# Patient Record
Sex: MALE | Race: BLACK OR AFRICAN AMERICAN | NOT HISPANIC OR LATINO | RURAL
[De-identification: names, ages, dates, MRNs, and addresses within clinical notes are randomized per-mention and may not be internally consistent; named-entity substitution may affect disease eponyms.]

---

## 2021-05-11 VITALS
HEART RATE: 96 BPM | DIASTOLIC BLOOD PRESSURE: 74 MMHG | SYSTOLIC BLOOD PRESSURE: 170 MMHG | HEIGHT: 74 IN | TEMPERATURE: 98 F | RESPIRATION RATE: 20 BRPM | BODY MASS INDEX: 35.04 KG/M2 | WEIGHT: 273 LBS

## 2021-05-11 RX ORDER — GABAPENTIN 600 MG/1
600 TABLET ORAL 3 TIMES DAILY
COMMUNITY
End: 2022-02-15 | Stop reason: ALTCHOICE

## 2021-05-11 RX ORDER — MODAFINIL 200 MG/1
200 TABLET ORAL DAILY PRN
COMMUNITY
End: 2021-11-15 | Stop reason: ALTCHOICE

## 2021-05-11 RX ORDER — ALPRAZOLAM 0.5 MG/1
0.5 TABLET ORAL 3 TIMES DAILY
COMMUNITY
End: 2021-05-17 | Stop reason: SDUPTHER

## 2021-05-11 RX ORDER — LOSARTAN POTASSIUM 100 MG/1
100 TABLET ORAL DAILY
COMMUNITY

## 2021-05-11 RX ORDER — DEXTROAMPHETAMINE SACCHARATE, AMPHETAMINE ASPARTATE MONOHYDRATE, DEXTROAMPHETAMINE SULFATE AND AMPHETAMINE SULFATE 7.5; 7.5; 7.5; 7.5 MG/1; MG/1; MG/1; MG/1
30 CAPSULE, EXTENDED RELEASE ORAL EVERY MORNING
COMMUNITY
End: 2021-07-08

## 2021-05-11 RX ORDER — ATORVASTATIN CALCIUM 10 MG/1
10 TABLET, FILM COATED ORAL DAILY
COMMUNITY

## 2021-05-11 RX ORDER — METFORMIN HYDROCHLORIDE 500 MG/1
500 TABLET ORAL DAILY
COMMUNITY
End: 2021-07-08

## 2021-05-11 RX ORDER — ACETAMINOPHEN 325 MG/1
325 TABLET ORAL EVERY 6 HOURS PRN
COMMUNITY
End: 2023-02-27

## 2021-05-11 RX ORDER — HYDROCHLOROTHIAZIDE 12.5 MG/1
12.5 TABLET ORAL DAILY
COMMUNITY

## 2021-05-17 ENCOUNTER — OFFICE VISIT (OUTPATIENT)
Dept: FAMILY MEDICINE | Facility: CLINIC | Age: 51
End: 2021-05-17
Payer: COMMERCIAL

## 2021-05-17 VITALS
DIASTOLIC BLOOD PRESSURE: 72 MMHG | BODY MASS INDEX: 34.93 KG/M2 | HEIGHT: 74 IN | SYSTOLIC BLOOD PRESSURE: 140 MMHG | RESPIRATION RATE: 16 BRPM | WEIGHT: 272.19 LBS | HEART RATE: 84 BPM

## 2021-05-17 DIAGNOSIS — E11.9 TYPE 2 DIABETES MELLITUS WITHOUT COMPLICATION, WITHOUT LONG-TERM CURRENT USE OF INSULIN: ICD-10-CM

## 2021-05-17 DIAGNOSIS — F43.11 POST-TRAUMATIC STRESS DISORDER, ACUTE: ICD-10-CM

## 2021-05-17 DIAGNOSIS — F41.0 PANIC DISORDER (EPISODIC PAROXYSMAL ANXIETY): ICD-10-CM

## 2021-05-17 DIAGNOSIS — Z79.899 ENCOUNTER FOR LONG-TERM (CURRENT) USE OF OTHER MEDICATIONS: Primary | ICD-10-CM

## 2021-05-17 DIAGNOSIS — F98.8 ATTENTION DEFICIT DISORDER (ADD) WITHOUT HYPERACTIVITY: ICD-10-CM

## 2021-05-17 DIAGNOSIS — I10 ESSENTIAL HYPERTENSION: ICD-10-CM

## 2021-05-17 LAB
CTP QC/QA: YES
POC (AMP) AMPHETAMINE: ABNORMAL
POC (BAR) BARBITURATES: NEGATIVE
POC (BUP) BUPRENORPHINE: NEGATIVE
POC (BZO) BENZODIAZEPINES: NEGATIVE
POC (COC) COCAINE: NEGATIVE
POC (MDMA) METHYLENEDIOXYMETHAMPHETAMINE 3,4: NEGATIVE
POC (MET) METHAMPHETAMINE: NEGATIVE
POC (MOP) OPIATES: NEGATIVE
POC (MTD) METHADONE: NEGATIVE
POC (OXY) OXYCODONE: NEGATIVE
POC (PCP) PHENCYCLIDINE: NEGATIVE
POC (TCA) TRICYCLIC ANTIDEPRESSANTS: NEGATIVE
POC TEMPERATURE (URINE): 90
POC THC: NEGATIVE

## 2021-05-17 PROCEDURE — 80305 POCT URINE DRUG SCREEN PRESUMP: ICD-10-PCS | Mod: QW,,, | Performed by: FAMILY MEDICINE

## 2021-05-17 PROCEDURE — 80305 DRUG TEST PRSMV DIR OPT OBS: CPT | Mod: QW,,, | Performed by: FAMILY MEDICINE

## 2021-05-17 PROCEDURE — 99213 OFFICE O/P EST LOW 20 MIN: CPT | Mod: ,,, | Performed by: FAMILY MEDICINE

## 2021-05-17 PROCEDURE — 99213 PR OFFICE/OUTPT VISIT, EST, LEVL III, 20-29 MIN: ICD-10-PCS | Mod: ,,, | Performed by: FAMILY MEDICINE

## 2021-05-17 RX ORDER — DEXTROAMPHETAMINE SACCHARATE, AMPHETAMINE ASPARTATE, DEXTROAMPHETAMINE SULFATE AND AMPHETAMINE SULFATE 7.5; 7.5; 7.5; 7.5 MG/1; MG/1; MG/1; MG/1
1 TABLET ORAL DAILY
Qty: 30 TABLET | Refills: 0 | Status: SHIPPED | OUTPATIENT
Start: 2021-05-17 | End: 2021-08-13 | Stop reason: SDUPTHER

## 2021-05-17 RX ORDER — DEXTROAMPHETAMINE SACCHARATE, AMPHETAMINE ASPARTATE, DEXTROAMPHETAMINE SULFATE AND AMPHETAMINE SULFATE 7.5; 7.5; 7.5; 7.5 MG/1; MG/1; MG/1; MG/1
1 TABLET ORAL DAILY
COMMUNITY
Start: 2021-03-25 | End: 2021-05-17 | Stop reason: SDUPTHER

## 2021-05-17 RX ORDER — ALPRAZOLAM 0.5 MG/1
0.5 TABLET ORAL 3 TIMES DAILY
Qty: 45 TABLET | Refills: 2 | Status: SHIPPED | OUTPATIENT
Start: 2021-05-17 | End: 2021-08-13 | Stop reason: SDUPTHER

## 2021-05-17 RX ORDER — METFORMIN HYDROCHLORIDE 500 MG/1
500 TABLET, EXTENDED RELEASE ORAL DAILY
COMMUNITY
Start: 2021-02-09

## 2021-07-08 ENCOUNTER — OFFICE VISIT (OUTPATIENT)
Dept: FAMILY MEDICINE | Facility: CLINIC | Age: 51
End: 2021-07-08
Payer: COMMERCIAL

## 2021-07-08 VITALS
WEIGHT: 278 LBS | BODY MASS INDEX: 39.8 KG/M2 | HEART RATE: 95 BPM | OXYGEN SATURATION: 99 % | HEIGHT: 70 IN | SYSTOLIC BLOOD PRESSURE: 148 MMHG | TEMPERATURE: 98 F | DIASTOLIC BLOOD PRESSURE: 66 MMHG | RESPIRATION RATE: 20 BRPM

## 2021-07-08 DIAGNOSIS — G89.29 CHRONIC PAIN OF BOTH ANKLES: ICD-10-CM

## 2021-07-08 DIAGNOSIS — M25.572 CHRONIC PAIN OF BOTH ANKLES: ICD-10-CM

## 2021-07-08 DIAGNOSIS — M25.562 CHRONIC PAIN OF BOTH KNEES: Primary | ICD-10-CM

## 2021-07-08 DIAGNOSIS — G89.29 CHRONIC PAIN OF BOTH KNEES: Primary | ICD-10-CM

## 2021-07-08 DIAGNOSIS — M25.561 CHRONIC PAIN OF BOTH KNEES: Primary | ICD-10-CM

## 2021-07-08 DIAGNOSIS — M25.571 CHRONIC PAIN OF BOTH ANKLES: ICD-10-CM

## 2021-07-08 PROCEDURE — 1126F AMNT PAIN NOTED NONE PRSNT: CPT | Mod: ,,, | Performed by: FAMILY MEDICINE

## 2021-07-08 PROCEDURE — 99212 PR OFFICE/OUTPT VISIT, EST, LEVL II, 10-19 MIN: ICD-10-PCS | Mod: ,,, | Performed by: FAMILY MEDICINE

## 2021-07-08 PROCEDURE — 3008F BODY MASS INDEX DOCD: CPT | Mod: ,,, | Performed by: FAMILY MEDICINE

## 2021-07-08 PROCEDURE — 99212 OFFICE O/P EST SF 10 MIN: CPT | Mod: ,,, | Performed by: FAMILY MEDICINE

## 2021-07-08 PROCEDURE — 3008F PR BODY MASS INDEX (BMI) DOCUMENTED: ICD-10-PCS | Mod: ,,, | Performed by: FAMILY MEDICINE

## 2021-07-08 PROCEDURE — 1126F PR PAIN SEVERITY QUANTIFIED, NO PAIN PRESENT: ICD-10-PCS | Mod: ,,, | Performed by: FAMILY MEDICINE

## 2021-07-08 RX ORDER — MODAFINIL 200 MG/1
200 TABLET ORAL DAILY PRN
Qty: 90 TABLET | Refills: 1 | Status: CANCELLED | OUTPATIENT
Start: 2021-07-08

## 2021-07-08 RX ORDER — CELECOXIB 100 MG/1
100 CAPSULE ORAL 2 TIMES DAILY
COMMUNITY

## 2021-07-12 ENCOUNTER — CLINICAL SUPPORT (OUTPATIENT)
Dept: FAMILY MEDICINE | Facility: CLINIC | Age: 51
End: 2021-07-12
Payer: COMMERCIAL

## 2021-07-12 VITALS — DIASTOLIC BLOOD PRESSURE: 90 MMHG | SYSTOLIC BLOOD PRESSURE: 138 MMHG

## 2021-07-13 ENCOUNTER — CLINICAL SUPPORT (OUTPATIENT)
Dept: FAMILY MEDICINE | Facility: CLINIC | Age: 51
End: 2021-07-13
Payer: COMMERCIAL

## 2021-07-13 VITALS — SYSTOLIC BLOOD PRESSURE: 118 MMHG | DIASTOLIC BLOOD PRESSURE: 76 MMHG

## 2021-07-13 DIAGNOSIS — I10 ESSENTIAL HYPERTENSION: Primary | ICD-10-CM

## 2021-07-14 ENCOUNTER — CLINICAL SUPPORT (OUTPATIENT)
Dept: FAMILY MEDICINE | Facility: CLINIC | Age: 51
End: 2021-07-14
Payer: COMMERCIAL

## 2021-07-14 VITALS — SYSTOLIC BLOOD PRESSURE: 140 MMHG | DIASTOLIC BLOOD PRESSURE: 62 MMHG

## 2021-07-14 DIAGNOSIS — I10 ESSENTIAL HYPERTENSION: Primary | ICD-10-CM

## 2021-08-13 ENCOUNTER — OFFICE VISIT (OUTPATIENT)
Dept: FAMILY MEDICINE | Facility: CLINIC | Age: 51
End: 2021-08-13
Payer: COMMERCIAL

## 2021-08-13 VITALS
RESPIRATION RATE: 18 BRPM | WEIGHT: 284.38 LBS | HEIGHT: 70 IN | SYSTOLIC BLOOD PRESSURE: 142 MMHG | HEART RATE: 72 BPM | DIASTOLIC BLOOD PRESSURE: 70 MMHG | BODY MASS INDEX: 40.71 KG/M2

## 2021-08-13 VITALS
HEART RATE: 77 BPM | OXYGEN SATURATION: 96 % | WEIGHT: 284 LBS | HEIGHT: 70 IN | DIASTOLIC BLOOD PRESSURE: 74 MMHG | BODY MASS INDEX: 40.66 KG/M2 | RESPIRATION RATE: 20 BRPM | SYSTOLIC BLOOD PRESSURE: 134 MMHG

## 2021-08-13 DIAGNOSIS — M17.0 OSTEOARTHRITIS OF BOTH KNEES, UNSPECIFIED OSTEOARTHRITIS TYPE: Primary | ICD-10-CM

## 2021-08-13 DIAGNOSIS — F98.8 ATTENTION DEFICIT DISORDER (ADD) WITHOUT HYPERACTIVITY: ICD-10-CM

## 2021-08-13 DIAGNOSIS — M19.079 DJD (DEGENERATIVE JOINT DISEASE), ANKLE AND FOOT, UNSPECIFIED LATERALITY: ICD-10-CM

## 2021-08-13 DIAGNOSIS — I10 ESSENTIAL HYPERTENSION: Primary | ICD-10-CM

## 2021-08-13 DIAGNOSIS — Z79.899 ENCOUNTER FOR LONG-TERM (CURRENT) USE OF OTHER MEDICATIONS: ICD-10-CM

## 2021-08-13 DIAGNOSIS — F41.0 PANIC DISORDER (EPISODIC PAROXYSMAL ANXIETY): ICD-10-CM

## 2021-08-13 DIAGNOSIS — E11.9 TYPE 2 DIABETES MELLITUS WITHOUT COMPLICATION, WITHOUT LONG-TERM CURRENT USE OF INSULIN: ICD-10-CM

## 2021-08-13 LAB
ALBUMIN SERPL BCP-MCNC: 3.5 G/DL (ref 3.5–5)
ALBUMIN/GLOB SERPL: 1 {RATIO}
ALP SERPL-CCNC: 85 U/L (ref 45–115)
ALT SERPL W P-5'-P-CCNC: 34 U/L (ref 16–61)
ANION GAP SERPL CALCULATED.3IONS-SCNC: 11 MMOL/L (ref 7–16)
AST SERPL W P-5'-P-CCNC: 17 U/L (ref 15–37)
BASOPHILS # BLD AUTO: 0.03 K/UL (ref 0–0.2)
BASOPHILS NFR BLD AUTO: 0.5 % (ref 0–1)
BILIRUB SERPL-MCNC: 0.4 MG/DL (ref 0–1.2)
BUN SERPL-MCNC: 20 MG/DL (ref 7–18)
BUN/CREAT SERPL: 17 (ref 6–20)
CALCIUM SERPL-MCNC: 8.7 MG/DL (ref 8.5–10.1)
CHLORIDE SERPL-SCNC: 106 MMOL/L (ref 98–107)
CHOLEST SERPL-MCNC: 149 MG/DL (ref 0–200)
CHOLEST/HDLC SERPL: 2.4 {RATIO}
CO2 SERPL-SCNC: 28 MMOL/L (ref 21–32)
CREAT SERPL-MCNC: 1.21 MG/DL (ref 0.7–1.3)
CTP QC/QA: YES
DIFFERENTIAL METHOD BLD: ABNORMAL
EOSINOPHIL # BLD AUTO: 0.11 K/UL (ref 0–0.5)
EOSINOPHIL NFR BLD AUTO: 2 % (ref 1–4)
ERYTHROCYTE [DISTWIDTH] IN BLOOD BY AUTOMATED COUNT: 14.7 % (ref 11.5–14.5)
EST. AVERAGE GLUCOSE BLD GHB EST-MCNC: 124 MG/DL
GLOBULIN SER-MCNC: 3.4 G/DL (ref 2–4)
GLUCOSE SERPL-MCNC: 206 MG/DL (ref 74–106)
HBA1C MFR BLD HPLC: 6.3 % (ref 4.5–6.6)
HCT VFR BLD AUTO: 42.6 % (ref 40–54)
HDLC SERPL-MCNC: 62 MG/DL (ref 40–60)
HGB BLD-MCNC: 13.8 G/DL (ref 13.5–18)
IMM GRANULOCYTES # BLD AUTO: 0.02 K/UL (ref 0–0.04)
IMM GRANULOCYTES NFR BLD: 0.4 % (ref 0–0.4)
LDLC SERPL CALC-MCNC: 74 MG/DL
LDLC/HDLC SERPL: 1.2 {RATIO}
LYMPHOCYTES # BLD AUTO: 1.25 K/UL (ref 1–4.8)
LYMPHOCYTES NFR BLD AUTO: 22.4 % (ref 27–41)
MCH RBC QN AUTO: 27.7 PG (ref 27–31)
MCHC RBC AUTO-ENTMCNC: 32.4 G/DL (ref 32–36)
MCV RBC AUTO: 85.4 FL (ref 80–96)
MONOCYTES # BLD AUTO: 0.28 K/UL (ref 0–0.8)
MONOCYTES NFR BLD AUTO: 5 % (ref 2–6)
MPC BLD CALC-MCNC: 10.7 FL (ref 9.4–12.4)
NEUTROPHILS # BLD AUTO: 3.88 K/UL (ref 1.8–7.7)
NEUTROPHILS NFR BLD AUTO: 69.7 % (ref 53–65)
NONHDLC SERPL-MCNC: 87 MG/DL
NRBC # BLD AUTO: 0 X10E3/UL
NRBC, AUTO (.00): 0 %
PLATELET # BLD AUTO: 227 K/UL (ref 150–400)
POC (AMP) AMPHETAMINE: NEGATIVE
POC (BAR) BARBITURATES: NEGATIVE
POC (BUP) BUPRENORPHINE: NEGATIVE
POC (BZO) BENZODIAZEPINES: ABNORMAL
POC (COC) COCAINE: NEGATIVE
POC (MDMA) METHYLENEDIOXYMETHAMPHETAMINE 3,4: NEGATIVE
POC (MET) METHAMPHETAMINE: NEGATIVE
POC (MOP) OPIATES: NEGATIVE
POC (MTD) METHADONE: NEGATIVE
POC (OXY) OXYCODONE: NEGATIVE
POC (PCP) PHENCYCLIDINE: NEGATIVE
POC (TCA) TRICYCLIC ANTIDEPRESSANTS: NEGATIVE
POC TEMPERATURE (URINE): 90
POC THC: NEGATIVE
POTASSIUM SERPL-SCNC: 4 MMOL/L (ref 3.5–5.1)
PROT SERPL-MCNC: 6.9 G/DL (ref 6.4–8.2)
RBC # BLD AUTO: 4.99 M/UL (ref 4.6–6.2)
SODIUM SERPL-SCNC: 141 MMOL/L (ref 136–145)
TRIGL SERPL-MCNC: 63 MG/DL (ref 35–150)
VLDLC SERPL-MCNC: 13 MG/DL
WBC # BLD AUTO: 5.57 K/UL (ref 4.5–11)

## 2021-08-13 PROCEDURE — 3075F PR MOST RECENT SYSTOLIC BLOOD PRESS GE 130-139MM HG: ICD-10-PCS | Mod: ,,, | Performed by: FAMILY MEDICINE

## 2021-08-13 PROCEDURE — 80305 DRUG TEST PRSMV DIR OPT OBS: CPT | Mod: QW,,, | Performed by: FAMILY MEDICINE

## 2021-08-13 PROCEDURE — 85025 CBC WITH DIFFERENTIAL: ICD-10-PCS | Mod: ,,, | Performed by: CLINICAL MEDICAL LABORATORY

## 2021-08-13 PROCEDURE — 3078F PR MOST RECENT DIASTOLIC BLOOD PRESSURE < 80 MM HG: ICD-10-PCS | Mod: ,,, | Performed by: FAMILY MEDICINE

## 2021-08-13 PROCEDURE — 3044F PR MOST RECENT HEMOGLOBIN A1C LEVEL <7.0%: ICD-10-PCS | Mod: ,,, | Performed by: FAMILY MEDICINE

## 2021-08-13 PROCEDURE — 80053 COMPREHENSIVE METABOLIC PANEL: ICD-10-PCS | Mod: ,,, | Performed by: CLINICAL MEDICAL LABORATORY

## 2021-08-13 PROCEDURE — 1159F MED LIST DOCD IN RCRD: CPT | Mod: ,,, | Performed by: FAMILY MEDICINE

## 2021-08-13 PROCEDURE — 80061 LIPID PANEL: CPT | Mod: ,,, | Performed by: CLINICAL MEDICAL LABORATORY

## 2021-08-13 PROCEDURE — 99212 PR OFFICE/OUTPT VISIT, EST, LEVL II, 10-19 MIN: ICD-10-PCS | Mod: ,,, | Performed by: FAMILY MEDICINE

## 2021-08-13 PROCEDURE — 99214 OFFICE O/P EST MOD 30 MIN: CPT | Mod: ,,, | Performed by: FAMILY MEDICINE

## 2021-08-13 PROCEDURE — 3078F DIAST BP <80 MM HG: CPT | Mod: ,,, | Performed by: FAMILY MEDICINE

## 2021-08-13 PROCEDURE — 1160F PR REVIEW ALL MEDS BY PRESCRIBER/CLIN PHARMACIST DOCUMENTED: ICD-10-PCS | Mod: ,,, | Performed by: FAMILY MEDICINE

## 2021-08-13 PROCEDURE — 3077F PR MOST RECENT SYSTOLIC BLOOD PRESSURE >= 140 MM HG: ICD-10-PCS | Mod: ,,, | Performed by: FAMILY MEDICINE

## 2021-08-13 PROCEDURE — 80061 LIPID PANEL: ICD-10-PCS | Mod: ,,, | Performed by: CLINICAL MEDICAL LABORATORY

## 2021-08-13 PROCEDURE — 83036 HEMOGLOBIN A1C: ICD-10-PCS | Mod: ,,, | Performed by: CLINICAL MEDICAL LABORATORY

## 2021-08-13 PROCEDURE — 3075F SYST BP GE 130 - 139MM HG: CPT | Mod: ,,, | Performed by: FAMILY MEDICINE

## 2021-08-13 PROCEDURE — 83036 HEMOGLOBIN GLYCOSYLATED A1C: CPT | Mod: ,,, | Performed by: CLINICAL MEDICAL LABORATORY

## 2021-08-13 PROCEDURE — 80053 COMPREHEN METABOLIC PANEL: CPT | Mod: ,,, | Performed by: CLINICAL MEDICAL LABORATORY

## 2021-08-13 PROCEDURE — 3077F SYST BP >= 140 MM HG: CPT | Mod: ,,, | Performed by: FAMILY MEDICINE

## 2021-08-13 PROCEDURE — 80305 POCT URINE DRUG SCREEN PRESUMP: ICD-10-PCS | Mod: QW,,, | Performed by: FAMILY MEDICINE

## 2021-08-13 PROCEDURE — 3008F PR BODY MASS INDEX (BMI) DOCUMENTED: ICD-10-PCS | Mod: ,,, | Performed by: FAMILY MEDICINE

## 2021-08-13 PROCEDURE — 1159F PR MEDICATION LIST DOCUMENTED IN MEDICAL RECORD: ICD-10-PCS | Mod: ,,, | Performed by: FAMILY MEDICINE

## 2021-08-13 PROCEDURE — 99214 PR OFFICE/OUTPT VISIT, EST, LEVL IV, 30-39 MIN: ICD-10-PCS | Mod: ,,, | Performed by: FAMILY MEDICINE

## 2021-08-13 PROCEDURE — 85025 COMPLETE CBC W/AUTO DIFF WBC: CPT | Mod: ,,, | Performed by: CLINICAL MEDICAL LABORATORY

## 2021-08-13 PROCEDURE — 1160F RVW MEDS BY RX/DR IN RCRD: CPT | Mod: ,,, | Performed by: FAMILY MEDICINE

## 2021-08-13 PROCEDURE — 3044F HG A1C LEVEL LT 7.0%: CPT | Mod: ,,, | Performed by: FAMILY MEDICINE

## 2021-08-13 PROCEDURE — 99212 OFFICE O/P EST SF 10 MIN: CPT | Mod: ,,, | Performed by: FAMILY MEDICINE

## 2021-08-13 PROCEDURE — 3008F BODY MASS INDEX DOCD: CPT | Mod: ,,, | Performed by: FAMILY MEDICINE

## 2021-08-13 RX ORDER — ALPRAZOLAM 0.5 MG/1
0.5 TABLET ORAL 3 TIMES DAILY
Qty: 45 TABLET | Refills: 2 | Status: SHIPPED | OUTPATIENT
Start: 2021-08-13 | End: 2021-11-15 | Stop reason: SDUPTHER

## 2021-08-13 RX ORDER — PENICILLIN V POTASSIUM 500 MG/1
500 TABLET, FILM COATED ORAL EVERY 6 HOURS
Qty: 40 TABLET | Refills: 0 | Status: SHIPPED | OUTPATIENT
Start: 2021-08-13 | End: 2021-11-15 | Stop reason: ALTCHOICE

## 2021-08-13 RX ORDER — DEXTROAMPHETAMINE SACCHARATE, AMPHETAMINE ASPARTATE, DEXTROAMPHETAMINE SULFATE AND AMPHETAMINE SULFATE 7.5; 7.5; 7.5; 7.5 MG/1; MG/1; MG/1; MG/1
1 TABLET ORAL DAILY
Qty: 30 TABLET | Refills: 0 | Status: SHIPPED | OUTPATIENT
Start: 2021-08-13 | End: 2021-08-13 | Stop reason: SDUPTHER

## 2021-08-13 RX ORDER — DEXTROAMPHETAMINE SACCHARATE, AMPHETAMINE ASPARTATE, DEXTROAMPHETAMINE SULFATE AND AMPHETAMINE SULFATE 7.5; 7.5; 7.5; 7.5 MG/1; MG/1; MG/1; MG/1
1 TABLET ORAL DAILY
Qty: 30 TABLET | Refills: 0 | Status: SHIPPED | OUTPATIENT
Start: 2021-08-13 | End: 2021-11-15 | Stop reason: SDUPTHER

## 2021-11-15 ENCOUNTER — OFFICE VISIT (OUTPATIENT)
Dept: FAMILY MEDICINE | Facility: CLINIC | Age: 51
End: 2021-11-15
Payer: COMMERCIAL

## 2021-11-15 VITALS
BODY MASS INDEX: 37.94 KG/M2 | TEMPERATURE: 98 F | RESPIRATION RATE: 18 BRPM | SYSTOLIC BLOOD PRESSURE: 123 MMHG | HEIGHT: 70 IN | DIASTOLIC BLOOD PRESSURE: 77 MMHG | WEIGHT: 265 LBS | HEART RATE: 71 BPM | OXYGEN SATURATION: 99 %

## 2021-11-15 VITALS — HEIGHT: 70 IN | RESPIRATION RATE: 18 BRPM | WEIGHT: 256.63 LBS | BODY MASS INDEX: 36.74 KG/M2

## 2021-11-15 DIAGNOSIS — Z79.899 ENCOUNTER FOR LONG-TERM (CURRENT) USE OF OTHER MEDICATIONS: ICD-10-CM

## 2021-11-15 DIAGNOSIS — F98.8 ATTENTION DEFICIT DISORDER (ADD) WITHOUT HYPERACTIVITY: ICD-10-CM

## 2021-11-15 DIAGNOSIS — E11.9 TYPE 2 DIABETES MELLITUS WITHOUT COMPLICATION, WITHOUT LONG-TERM CURRENT USE OF INSULIN: ICD-10-CM

## 2021-11-15 DIAGNOSIS — I10 HYPERTENSION, UNSPECIFIED TYPE: ICD-10-CM

## 2021-11-15 DIAGNOSIS — K04.7 DENTAL ABSCESS: ICD-10-CM

## 2021-11-15 DIAGNOSIS — E11.9 TYPE 2 DIABETES MELLITUS WITHOUT COMPLICATION, WITHOUT LONG-TERM CURRENT USE OF INSULIN: Primary | ICD-10-CM

## 2021-11-15 DIAGNOSIS — F41.0 PANIC DISORDER (EPISODIC PAROXYSMAL ANXIETY): Primary | ICD-10-CM

## 2021-11-15 LAB
CTP QC/QA: YES
EST. AVERAGE GLUCOSE BLD GHB EST-MCNC: 110 MG/DL
HBA1C MFR BLD HPLC: 5.9 % (ref 4.5–6.6)
POC (AMP) AMPHETAMINE: NEGATIVE
POC (BAR) BARBITURATES: NEGATIVE
POC (BUP) BUPRENORPHINE: NEGATIVE
POC (BZO) BENZODIAZEPINES: NEGATIVE
POC (COC) COCAINE: NEGATIVE
POC (MDMA) METHYLENEDIOXYMETHAMPHETAMINE 3,4: NEGATIVE
POC (MET) METHAMPHETAMINE: NEGATIVE
POC (MOP) OPIATES: NEGATIVE
POC (MTD) METHADONE: NEGATIVE
POC (OXY) OXYCODONE: NEGATIVE
POC (PCP) PHENCYCLIDINE: NEGATIVE
POC (TCA) TRICYCLIC ANTIDEPRESSANTS: NEGATIVE
POC TEMPERATURE (URINE): 92
POC THC: NEGATIVE

## 2021-11-15 PROCEDURE — 3008F BODY MASS INDEX DOCD: CPT | Mod: ,,, | Performed by: FAMILY MEDICINE

## 2021-11-15 PROCEDURE — 83036 HEMOGLOBIN GLYCOSYLATED A1C: CPT | Mod: ,,, | Performed by: CLINICAL MEDICAL LABORATORY

## 2021-11-15 PROCEDURE — 99214 OFFICE O/P EST MOD 30 MIN: CPT | Mod: ,,, | Performed by: FAMILY MEDICINE

## 2021-11-15 PROCEDURE — 80305 DRUG TEST PRSMV DIR OPT OBS: CPT | Mod: QW,,, | Performed by: FAMILY MEDICINE

## 2021-11-15 PROCEDURE — 80305 POCT URINE DRUG SCREEN PRESUMP: ICD-10-PCS | Mod: QW,,, | Performed by: FAMILY MEDICINE

## 2021-11-15 PROCEDURE — 4010F PR ACE/ARB THEARPY RXD/TAKEN: ICD-10-PCS | Mod: ,,, | Performed by: FAMILY MEDICINE

## 2021-11-15 PROCEDURE — 3074F PR MOST RECENT SYSTOLIC BLOOD PRESSURE < 130 MM HG: ICD-10-PCS | Mod: ,,, | Performed by: FAMILY MEDICINE

## 2021-11-15 PROCEDURE — 3044F PR MOST RECENT HEMOGLOBIN A1C LEVEL <7.0%: ICD-10-PCS | Mod: ,,, | Performed by: FAMILY MEDICINE

## 2021-11-15 PROCEDURE — 3044F HG A1C LEVEL LT 7.0%: CPT | Mod: ,,, | Performed by: FAMILY MEDICINE

## 2021-11-15 PROCEDURE — 83036 HEMOGLOBIN A1C: ICD-10-PCS | Mod: ,,, | Performed by: CLINICAL MEDICAL LABORATORY

## 2021-11-15 PROCEDURE — 99214 PR OFFICE/OUTPT VISIT, EST, LEVL IV, 30-39 MIN: ICD-10-PCS | Mod: ,,, | Performed by: FAMILY MEDICINE

## 2021-11-15 PROCEDURE — 3078F PR MOST RECENT DIASTOLIC BLOOD PRESSURE < 80 MM HG: ICD-10-PCS | Mod: ,,, | Performed by: FAMILY MEDICINE

## 2021-11-15 PROCEDURE — 3074F SYST BP LT 130 MM HG: CPT | Mod: ,,, | Performed by: FAMILY MEDICINE

## 2021-11-15 PROCEDURE — 4010F ACE/ARB THERAPY RXD/TAKEN: CPT | Mod: ,,, | Performed by: FAMILY MEDICINE

## 2021-11-15 PROCEDURE — 3078F DIAST BP <80 MM HG: CPT | Mod: ,,, | Performed by: FAMILY MEDICINE

## 2021-11-15 PROCEDURE — 3008F PR BODY MASS INDEX (BMI) DOCUMENTED: ICD-10-PCS | Mod: ,,, | Performed by: FAMILY MEDICINE

## 2021-11-15 PROCEDURE — 1159F PR MEDICATION LIST DOCUMENTED IN MEDICAL RECORD: ICD-10-PCS | Mod: ,,, | Performed by: FAMILY MEDICINE

## 2021-11-15 PROCEDURE — 1159F MED LIST DOCD IN RCRD: CPT | Mod: ,,, | Performed by: FAMILY MEDICINE

## 2021-11-15 PROCEDURE — 99212 OFFICE O/P EST SF 10 MIN: CPT | Mod: ,,, | Performed by: FAMILY MEDICINE

## 2021-11-15 PROCEDURE — 99212 PR OFFICE/OUTPT VISIT, EST, LEVL II, 10-19 MIN: ICD-10-PCS | Mod: ,,, | Performed by: FAMILY MEDICINE

## 2021-11-15 RX ORDER — DEXTROAMPHETAMINE SACCHARATE, AMPHETAMINE ASPARTATE, DEXTROAMPHETAMINE SULFATE AND AMPHETAMINE SULFATE 7.5; 7.5; 7.5; 7.5 MG/1; MG/1; MG/1; MG/1
1 TABLET ORAL DAILY
Qty: 30 TABLET | Refills: 0 | Status: SHIPPED | OUTPATIENT
Start: 2021-11-15 | End: 2021-11-15 | Stop reason: SDUPTHER

## 2021-11-15 RX ORDER — ALPRAZOLAM 0.5 MG/1
0.5 TABLET ORAL 3 TIMES DAILY
Qty: 45 TABLET | Refills: 2 | Status: SHIPPED | OUTPATIENT
Start: 2021-11-15 | End: 2022-02-15 | Stop reason: SDUPTHER

## 2021-11-15 RX ORDER — DEXTROAMPHETAMINE SACCHARATE, AMPHETAMINE ASPARTATE, DEXTROAMPHETAMINE SULFATE AND AMPHETAMINE SULFATE 7.5; 7.5; 7.5; 7.5 MG/1; MG/1; MG/1; MG/1
1 TABLET ORAL DAILY
Qty: 30 TABLET | Refills: 0 | Status: SHIPPED | OUTPATIENT
Start: 2021-11-15 | End: 2022-02-15 | Stop reason: SDUPTHER

## 2021-11-15 RX ORDER — PENICILLIN V POTASSIUM 500 MG/1
500 TABLET, FILM COATED ORAL 4 TIMES DAILY
Qty: 40 TABLET | Refills: 0 | Status: SHIPPED | OUTPATIENT
Start: 2021-11-15 | End: 2022-02-15 | Stop reason: ALTCHOICE

## 2022-01-03 ENCOUNTER — TELEPHONE (OUTPATIENT)
Dept: FAMILY MEDICINE | Facility: CLINIC | Age: 52
End: 2022-01-03
Payer: COMMERCIAL

## 2022-01-03 NOTE — TELEPHONE ENCOUNTER
Pt called stating that he lost his Rx's for Adderall and Xannax. Lost Rx's were written on 11/15/21. Pt wants to know if you can write new ones?

## 2022-02-15 ENCOUNTER — OFFICE VISIT (OUTPATIENT)
Dept: FAMILY MEDICINE | Facility: CLINIC | Age: 52
End: 2022-02-15
Payer: COMMERCIAL

## 2022-02-15 VITALS
RESPIRATION RATE: 18 BRPM | HEIGHT: 70 IN | WEIGHT: 263.63 LBS | SYSTOLIC BLOOD PRESSURE: 120 MMHG | DIASTOLIC BLOOD PRESSURE: 60 MMHG | BODY MASS INDEX: 37.74 KG/M2 | HEART RATE: 84 BPM

## 2022-02-15 DIAGNOSIS — Z79.899 ENCOUNTER FOR LONG-TERM (CURRENT) USE OF OTHER MEDICATIONS: ICD-10-CM

## 2022-02-15 DIAGNOSIS — F41.0 PANIC DISORDER (EPISODIC PAROXYSMAL ANXIETY): ICD-10-CM

## 2022-02-15 DIAGNOSIS — Z13.220 SCREENING FOR LIPOID DISORDERS: ICD-10-CM

## 2022-02-15 DIAGNOSIS — Z00.00 ROUTINE GENERAL MEDICAL EXAMINATION AT A HEALTH CARE FACILITY: Primary | ICD-10-CM

## 2022-02-15 DIAGNOSIS — Z13.1 SCREENING FOR DIABETES MELLITUS: ICD-10-CM

## 2022-02-15 DIAGNOSIS — F98.8 ATTENTION DEFICIT DISORDER (ADD) WITHOUT HYPERACTIVITY: ICD-10-CM

## 2022-02-15 DIAGNOSIS — E11.9 TYPE 2 DIABETES MELLITUS WITHOUT COMPLICATION, WITHOUT LONG-TERM CURRENT USE OF INSULIN: ICD-10-CM

## 2022-02-15 DIAGNOSIS — I10 PRIMARY HYPERTENSION: ICD-10-CM

## 2022-02-15 LAB
ALBUMIN SERPL BCP-MCNC: 3.5 G/DL (ref 3.5–5)
ALBUMIN/GLOB SERPL: 1 {RATIO}
ALP SERPL-CCNC: 83 U/L (ref 45–115)
ALT SERPL W P-5'-P-CCNC: 42 U/L (ref 16–61)
ANION GAP SERPL CALCULATED.3IONS-SCNC: 8 MMOL/L (ref 7–16)
AST SERPL W P-5'-P-CCNC: 18 U/L (ref 15–37)
BASOPHILS # BLD AUTO: 0.03 K/UL (ref 0–0.2)
BASOPHILS NFR BLD AUTO: 0.4 % (ref 0–1)
BILIRUB SERPL-MCNC: 0.6 MG/DL (ref 0–1.2)
BUN SERPL-MCNC: 20 MG/DL (ref 7–18)
BUN/CREAT SERPL: 20 (ref 6–20)
CALCIUM SERPL-MCNC: 8.6 MG/DL (ref 8.5–10.1)
CHLORIDE SERPL-SCNC: 103 MMOL/L (ref 98–107)
CHOLEST SERPL-MCNC: 150 MG/DL (ref 0–200)
CHOLEST/HDLC SERPL: 2.1 {RATIO}
CO2 SERPL-SCNC: 30 MMOL/L (ref 21–32)
CREAT SERPL-MCNC: 1 MG/DL (ref 0.7–1.3)
CTP QC/QA: YES
DIFFERENTIAL METHOD BLD: ABNORMAL
EOSINOPHIL # BLD AUTO: 0.12 K/UL (ref 0–0.5)
EOSINOPHIL NFR BLD AUTO: 1.7 % (ref 1–4)
ERYTHROCYTE [DISTWIDTH] IN BLOOD BY AUTOMATED COUNT: 15.8 % (ref 11.5–14.5)
EST. AVERAGE GLUCOSE BLD GHB EST-MCNC: 104 MG/DL
GLOBULIN SER-MCNC: 3.6 G/DL (ref 2–4)
GLUCOSE SERPL-MCNC: 121 MG/DL (ref 74–106)
HBA1C MFR BLD HPLC: 5.7 % (ref 4.5–6.6)
HCT VFR BLD AUTO: 43.2 % (ref 40–54)
HDLC SERPL-MCNC: 70 MG/DL (ref 40–60)
HGB BLD-MCNC: 14.2 G/DL (ref 13.5–18)
IMM GRANULOCYTES # BLD AUTO: 0.02 K/UL (ref 0–0.04)
IMM GRANULOCYTES NFR BLD: 0.3 % (ref 0–0.4)
LDLC SERPL CALC-MCNC: 67 MG/DL
LDLC/HDLC SERPL: 1 {RATIO}
LYMPHOCYTES # BLD AUTO: 1.44 K/UL (ref 1–4.8)
LYMPHOCYTES NFR BLD AUTO: 20.1 % (ref 27–41)
MCH RBC QN AUTO: 28.7 PG (ref 27–31)
MCHC RBC AUTO-ENTMCNC: 32.9 G/DL (ref 32–36)
MCV RBC AUTO: 87.4 FL (ref 80–96)
MONOCYTES # BLD AUTO: 0.38 K/UL (ref 0–0.8)
MONOCYTES NFR BLD AUTO: 5.3 % (ref 2–6)
MPC BLD CALC-MCNC: 10.6 FL (ref 9.4–12.4)
NEUTROPHILS # BLD AUTO: 5.17 K/UL (ref 1.8–7.7)
NEUTROPHILS NFR BLD AUTO: 72.2 % (ref 53–65)
NONHDLC SERPL-MCNC: 80 MG/DL
NRBC # BLD AUTO: 0 X10E3/UL
NRBC, AUTO (.00): 0 %
PLATELET # BLD AUTO: 221 K/UL (ref 150–400)
POC (AMP) AMPHETAMINE: ABNORMAL
POC (BAR) BARBITURATES: NEGATIVE
POC (BUP) BUPRENORPHINE: NEGATIVE
POC (BZO) BENZODIAZEPINES: NEGATIVE
POC (COC) COCAINE: NEGATIVE
POC (MDMA) METHYLENEDIOXYMETHAMPHETAMINE 3,4: NEGATIVE
POC (MET) METHAMPHETAMINE: NEGATIVE
POC (MOP) OPIATES: NEGATIVE
POC (MTD) METHADONE: NEGATIVE
POC (OXY) OXYCODONE: NEGATIVE
POC (PCP) PHENCYCLIDINE: NEGATIVE
POC (TCA) TRICYCLIC ANTIDEPRESSANTS: ABNORMAL
POC TEMPERATURE (URINE): 92
POC THC: NEGATIVE
POTASSIUM SERPL-SCNC: 3.7 MMOL/L (ref 3.5–5.1)
PROT SERPL-MCNC: 7.1 G/DL (ref 6.4–8.2)
RBC # BLD AUTO: 4.94 M/UL (ref 4.6–6.2)
SODIUM SERPL-SCNC: 137 MMOL/L (ref 136–145)
TRIGL SERPL-MCNC: 64 MG/DL (ref 35–150)
VLDLC SERPL-MCNC: 13 MG/DL
WBC # BLD AUTO: 7.16 K/UL (ref 4.5–11)

## 2022-02-15 PROCEDURE — 3074F PR MOST RECENT SYSTOLIC BLOOD PRESSURE < 130 MM HG: ICD-10-PCS | Mod: ,,, | Performed by: FAMILY MEDICINE

## 2022-02-15 PROCEDURE — 80053 COMPREHEN METABOLIC PANEL: CPT | Mod: ,,, | Performed by: CLINICAL MEDICAL LABORATORY

## 2022-02-15 PROCEDURE — 83036 HEMOGLOBIN A1C: ICD-10-PCS | Mod: ,,, | Performed by: CLINICAL MEDICAL LABORATORY

## 2022-02-15 PROCEDURE — 85025 COMPLETE CBC W/AUTO DIFF WBC: CPT | Mod: ,,, | Performed by: CLINICAL MEDICAL LABORATORY

## 2022-02-15 PROCEDURE — 80061 LIPID PANEL: ICD-10-PCS | Mod: ,,, | Performed by: CLINICAL MEDICAL LABORATORY

## 2022-02-15 PROCEDURE — 80053 COMPREHENSIVE METABOLIC PANEL: ICD-10-PCS | Mod: ,,, | Performed by: CLINICAL MEDICAL LABORATORY

## 2022-02-15 PROCEDURE — 3044F HG A1C LEVEL LT 7.0%: CPT | Mod: ,,, | Performed by: FAMILY MEDICINE

## 2022-02-15 PROCEDURE — 1160F RVW MEDS BY RX/DR IN RCRD: CPT | Mod: ,,, | Performed by: FAMILY MEDICINE

## 2022-02-15 PROCEDURE — 83036 HEMOGLOBIN GLYCOSYLATED A1C: CPT | Mod: ,,, | Performed by: CLINICAL MEDICAL LABORATORY

## 2022-02-15 PROCEDURE — 3078F DIAST BP <80 MM HG: CPT | Mod: ,,, | Performed by: FAMILY MEDICINE

## 2022-02-15 PROCEDURE — 80305 DRUG TEST PRSMV DIR OPT OBS: CPT | Mod: QW,,, | Performed by: FAMILY MEDICINE

## 2022-02-15 PROCEDURE — 85025 CBC WITH DIFFERENTIAL: ICD-10-PCS | Mod: ,,, | Performed by: CLINICAL MEDICAL LABORATORY

## 2022-02-15 PROCEDURE — 3008F BODY MASS INDEX DOCD: CPT | Mod: ,,, | Performed by: FAMILY MEDICINE

## 2022-02-15 PROCEDURE — 99396 PR PREVENTIVE VISIT,EST,40-64: ICD-10-PCS | Mod: ,,, | Performed by: FAMILY MEDICINE

## 2022-02-15 PROCEDURE — 80305 POCT URINE DRUG SCREEN PRESUMP: ICD-10-PCS | Mod: QW,,, | Performed by: FAMILY MEDICINE

## 2022-02-15 PROCEDURE — 3074F SYST BP LT 130 MM HG: CPT | Mod: ,,, | Performed by: FAMILY MEDICINE

## 2022-02-15 PROCEDURE — 1159F PR MEDICATION LIST DOCUMENTED IN MEDICAL RECORD: ICD-10-PCS | Mod: ,,, | Performed by: FAMILY MEDICINE

## 2022-02-15 PROCEDURE — 3078F PR MOST RECENT DIASTOLIC BLOOD PRESSURE < 80 MM HG: ICD-10-PCS | Mod: ,,, | Performed by: FAMILY MEDICINE

## 2022-02-15 PROCEDURE — 1159F MED LIST DOCD IN RCRD: CPT | Mod: ,,, | Performed by: FAMILY MEDICINE

## 2022-02-15 PROCEDURE — 3008F PR BODY MASS INDEX (BMI) DOCUMENTED: ICD-10-PCS | Mod: ,,, | Performed by: FAMILY MEDICINE

## 2022-02-15 PROCEDURE — 99396 PREV VISIT EST AGE 40-64: CPT | Mod: ,,, | Performed by: FAMILY MEDICINE

## 2022-02-15 PROCEDURE — 80061 LIPID PANEL: CPT | Mod: ,,, | Performed by: CLINICAL MEDICAL LABORATORY

## 2022-02-15 PROCEDURE — 3044F PR MOST RECENT HEMOGLOBIN A1C LEVEL <7.0%: ICD-10-PCS | Mod: ,,, | Performed by: FAMILY MEDICINE

## 2022-02-15 PROCEDURE — 1160F PR REVIEW ALL MEDS BY PRESCRIBER/CLIN PHARMACIST DOCUMENTED: ICD-10-PCS | Mod: ,,, | Performed by: FAMILY MEDICINE

## 2022-02-15 RX ORDER — DEXTROAMPHETAMINE SACCHARATE, AMPHETAMINE ASPARTATE, DEXTROAMPHETAMINE SULFATE AND AMPHETAMINE SULFATE 7.5; 7.5; 7.5; 7.5 MG/1; MG/1; MG/1; MG/1
1 TABLET ORAL DAILY
Qty: 30 TABLET | Refills: 0 | Status: SHIPPED | OUTPATIENT
Start: 2022-02-15 | End: 2022-05-16 | Stop reason: SDUPTHER

## 2022-02-15 RX ORDER — DEXTROAMPHETAMINE SACCHARATE, AMPHETAMINE ASPARTATE, DEXTROAMPHETAMINE SULFATE AND AMPHETAMINE SULFATE 7.5; 7.5; 7.5; 7.5 MG/1; MG/1; MG/1; MG/1
1 TABLET ORAL DAILY
Qty: 30 TABLET | Refills: 0 | Status: SHIPPED | OUTPATIENT
Start: 2022-02-15 | End: 2022-02-15 | Stop reason: SDUPTHER

## 2022-02-15 RX ORDER — PREGABALIN 100 MG/1
100 CAPSULE ORAL 2 TIMES DAILY
COMMUNITY
Start: 2022-02-10 | End: 2023-05-26 | Stop reason: SDUPTHER

## 2022-02-15 RX ORDER — ALPRAZOLAM 0.5 MG/1
0.5 TABLET ORAL 3 TIMES DAILY
Qty: 45 TABLET | Refills: 2 | Status: SHIPPED | OUTPATIENT
Start: 2022-02-15 | End: 2022-05-16 | Stop reason: SDUPTHER

## 2022-02-15 NOTE — PROGRESS NOTES
Josesito Breaux MD        PATIENT NAME: Osmar Brito  : 1970  DATE: 2/15/22  MRN: [id number]      Billing Provider: Josesito Breaux MD  Level of Service: IA PREVENTIVE VISIT,EST,40-64  Patient PCP Information     Provider PCP Type    Josesito Breaux MD General          Reason for Visit / Chief Complaint: Annual Exam (Healthy You), Diabetes, Anxiety, Medication Refill (3 mo), and ADHD       Update PCP  Update Chief Complaint         History of Present Illness / Problem Focused Workflow     Osmar Brito presents to the clinic with Annual Exam (Healthy You), Diabetes, Anxiety, Medication Refill (3 mo), and ADHD     Routine followup.  No significant interval change. Healthy You     Diabetes  Hypoglycemia symptoms include nervousness/anxiousness. Pertinent negatives for hypoglycemia include no confusion, dizziness, headaches, seizures or tremors. Pertinent negatives for diabetes include no chest pain, no polydipsia, no polyphagia, no polyuria and no weakness (global weakness).   Anxiety  Symptoms include decreased concentration and nervous/anxious behavior. Patient reports no chest pain, confusion, dizziness, nausea or palpitations.       Medication Refill  Pertinent negatives include no abdominal pain, arthralgias, chest pain, congestion, coughing, fever, headaches, nausea, neck pain, rash, sore throat or weakness (global weakness).       Review of Systems     Review of Systems   Constitutional: Negative for activity change, appetite change, fever and unexpected weight change.   HENT: Negative for congestion, rhinorrhea, sinus pressure, sinus pain, sore throat and trouble swallowing.    Eyes: Negative for photophobia, pain, discharge and visual disturbance.   Respiratory: Negative for cough, chest tightness, wheezing and stridor.    Cardiovascular: Negative for chest pain, palpitations and leg swelling.   Gastrointestinal: Negative for abdominal pain, blood in stool, constipation, diarrhea and nausea.   Endocrine:  Negative for polydipsia, polyphagia and polyuria.   Genitourinary: Negative for difficulty urinating, flank pain and hematuria.   Musculoskeletal: Negative for arthralgias and neck pain.   Skin: Negative for rash.   Allergic/Immunologic: Negative for food allergies.   Neurological: Negative for dizziness, tremors, seizures, syncope, weakness (global weakness) and headaches.   Psychiatric/Behavioral: Positive for decreased concentration. Negative for behavioral problems, confusion, dysphoric mood and hallucinations. The patient is nervous/anxious.         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 No pertinent surgical history.    Social History    reports that he has quit smoking. He has never used smokeless tobacco. He reports that he does not drink alcohol and does not use drugs.    Family History  's family history includes Diabetes in his maternal aunt and maternal uncle; Stroke in his mother.    Medications and Allergies     Medications  Outpatient Medications Marked as Taking for the 2/15/22 encounter (Office Visit) with Josesito Breaux MD   Medication Sig Dispense Refill    acetaminophen (TYLENOL) 325 MG tablet Take 325 mg by mouth every 6 (six) hours as needed for Pain.      atorvastatin (LIPITOR) 10 MG tablet Take 10 mg by mouth once daily.      calcium carbonate/vitamin D3 (CALCIUM WITH VITAMIN D ORAL) Take by mouth. Take one tablet by mouth daily      celecoxib (CELEBREX) 100 MG capsule Take 100 mg by mouth 2 (two) times daily.      hydroCHLOROthiazide (HYDRODIURIL) 12.5 MG Tab Take 12.5 mg by mouth once daily.      losartan (COZAAR) 100 MG tablet Take 100 mg by mouth once daily.       metFORMIN (GLUCOPHAGE-XR) 500 MG ER 24hr tablet Take 500 mg by mouth once daily.       pregabalin (LYRICA) 100 MG capsule Take 100 mg by mouth 2 (two) times a day.      [DISCONTINUED] ALPRAZolam (XANAX) 0.5 MG tablet Take 1 tablet (0.5 mg total) by mouth 3 (three) times daily. 45 tablet 2    [DISCONTINUED] dextroamphetamine-amphetamine 30 mg Tab Take 1 tablet (30 mg total) by mouth once daily. 30 tablet 0       Allergies  Review of patient's allergies indicates:  No Known Allergies    Physical Examination     Vitals:    02/15/22 0748   BP: 120/60   Pulse: 84   Resp: 18     Physical Exam  Constitutional:       General: He is not in acute distress.     Appearance: Normal appearance.   HENT:      Head: Normocephalic.      Right Ear: Tympanic membrane and ear canal normal.      Left Ear: Tympanic membrane and ear canal normal.      Nose: Nose normal.      Mouth/Throat:      Mouth: Mucous membranes are moist.      Pharynx: No oropharyngeal exudate.   Eyes:      Extraocular Movements: Extraocular movements intact.      Pupils: Pupils are equal, round, and reactive to light.   Cardiovascular:      Rate and Rhythm: Normal rate and regular rhythm.      Heart sounds: No murmur heard.      Pulmonary:      Effort: Pulmonary effort is normal.      Breath sounds: Normal breath sounds. No wheezing.   Abdominal:      General: Abdomen is flat. Bowel sounds are normal.      Palpations: Abdomen is soft.      Hernia: No hernia is present.   Musculoskeletal:         General: Normal range of motion.      Cervical back: Normal range of motion and neck supple.      Right lower leg: No edema.      Left lower leg: No edema.   Lymphadenopathy:      Cervical: No cervical adenopathy.   Skin:     General: Skin is warm and dry.      Coloration: Skin is not jaundiced.      Findings: No lesion.   Neurological:      General: No focal deficit present.      Mental Status: He is alert and oriented to person, place, and time.      Cranial Nerves:  No cranial nerve deficit.      Gait: Gait normal.   Psychiatric:         Mood and Affect: Mood normal.         Behavior: Behavior normal.         Judgment: Judgment normal.          Assessment and Plan (including Health Maintenance)      Problem List  Smart Sets  Document Outside HM   :    Plan:   Routine general medical examination at a health care facility    Screening for lipoid disorders  -     Lipid Panel    Screening for diabetes mellitus  -     Cancel: Glucose, Fasting    Encounter for long-term (current) use of other medications  -     POCT Urine Drug Screen Presump    Type 2 diabetes mellitus without complication, without long-term current use of insulin  -     Hemoglobin A1C    Primary hypertension  -     CBC Auto Differential  -     Comprehensive Metabolic Panel    Panic disorder (episodic paroxysmal anxiety)  -     ALPRAZolam (XANAX) 0.5 MG tablet; Take 1 tablet (0.5 mg total) by mouth 3 (three) times daily.  Dispense: 45 tablet; Refill: 2    Attention deficit disorder (ADD) without hyperactivity  -     Discontinue: dextroamphetamine-amphetamine 30 mg Tab; Take 1 tablet (30 mg total) by mouth once daily.  Dispense: 30 tablet; Refill: 0  -     Discontinue: dextroamphetamine-amphetamine 30 mg Tab; Take 1 tablet (30 mg total) by mouth once daily.  Dispense: 30 tablet; Refill: 0  -     dextroamphetamine-amphetamine 30 mg Tab; Take 1 tablet (30 mg total) by mouth once daily.  Dispense: 30 tablet; Refill: 0           Health Maintenance Due   Topic Date Due    Hepatitis C Screening  Never done    Diabetes Urine Screening  Never done    COVID-19 Vaccine (1) Never done    Pneumococcal Vaccines (Age 0-64) (1 of 2 - PPSV23) Never done    Eye Exam  Never done    HIV Screening  Never done    TETANUS VACCINE  Never done    Colorectal Cancer Screening  Never done    Influenza Vaccine (1) 09/01/2021       Problem List Items Addressed This Visit        Psychiatric    Panic disorder (episodic paroxysmal anxiety)     Relevant Medications    ALPRAZolam (XANAX) 0.5 MG tablet       Cardiac/Vascular    Hypertension    Relevant Orders    CBC Auto Differential (Completed)    Comprehensive Metabolic Panel (Completed)       Endocrine    Type II diabetes mellitus    Relevant Orders    Hemoglobin A1C (Completed)      Other Visit Diagnoses     Routine general medical examination at a health care facility    -  Primary    Screening for lipoid disorders        Relevant Orders    Lipid Panel (Completed)    Screening for diabetes mellitus        Encounter for long-term (current) use of other medications        Relevant Orders    POCT Urine Drug Screen Presump (Completed)    Attention deficit disorder (ADD) without hyperactivity        Relevant Medications    dextroamphetamine-amphetamine 30 mg Tab          Health Maintenance Topics with due status: Not Due       Topic Last Completion Date    Low Dose Statin 02/15/2022    Foot Exam 02/15/2022    Lipid Panel 02/15/2022    Hemoglobin A1c 02/15/2022       Future Appointments   Date Time Provider Department Center   3/7/2022  9:15 AM Luis Mosley MD [facility]   5/16/2022  7:40 AM Josesito Breaux MD [facility]   2/17/2023  7:50 AM [facility]      Health goals for the coming year:  Avoid adding table salt to foods  Exercise 30 min daily 5 days/week  Develop good social support system    There are no Patient Instructions on file for this visit.  Follow up in about 3 months (around 5/15/2022).     Signature:  Josesito Breaux MD      Date of encounter: 2/15/22

## 2022-05-16 ENCOUNTER — OFFICE VISIT (OUTPATIENT)
Dept: FAMILY MEDICINE | Facility: CLINIC | Age: 52
End: 2022-05-16
Payer: COMMERCIAL

## 2022-05-16 VITALS
TEMPERATURE: 98 F | HEIGHT: 70 IN | RESPIRATION RATE: 18 BRPM | BODY MASS INDEX: 38.34 KG/M2 | HEART RATE: 73 BPM | WEIGHT: 267.81 LBS | OXYGEN SATURATION: 98 % | SYSTOLIC BLOOD PRESSURE: 127 MMHG | DIASTOLIC BLOOD PRESSURE: 65 MMHG

## 2022-05-16 DIAGNOSIS — E11.9 TYPE 2 DIABETES MELLITUS WITHOUT COMPLICATION, WITHOUT LONG-TERM CURRENT USE OF INSULIN: Primary | ICD-10-CM

## 2022-05-16 DIAGNOSIS — F43.11 POST-TRAUMATIC STRESS DISORDER, ACUTE: ICD-10-CM

## 2022-05-16 DIAGNOSIS — Z79.899 ENCOUNTER FOR LONG-TERM (CURRENT) USE OF OTHER MEDICATIONS: ICD-10-CM

## 2022-05-16 DIAGNOSIS — F98.8 ATTENTION DEFICIT DISORDER (ADD) WITHOUT HYPERACTIVITY: ICD-10-CM

## 2022-05-16 DIAGNOSIS — F41.0 PANIC DISORDER (EPISODIC PAROXYSMAL ANXIETY): ICD-10-CM

## 2022-05-16 LAB
CTP QC/QA: YES
EST. AVERAGE GLUCOSE BLD GHB EST-MCNC: 110 MG/DL
HBA1C MFR BLD HPLC: 5.9 % (ref 4.5–6.6)
POC (AMP) AMPHETAMINE: NEGATIVE
POC (BAR) BARBITURATES: NEGATIVE
POC (BUP) BUPRENORPHINE: NEGATIVE
POC (BZO) BENZODIAZEPINES: NEGATIVE
POC (COC) COCAINE: NEGATIVE
POC (MDMA) METHYLENEDIOXYMETHAMPHETAMINE 3,4: NEGATIVE
POC (MET) METHAMPHETAMINE: NEGATIVE
POC (MOP) OPIATES: NEGATIVE
POC (MTD) METHADONE: NEGATIVE
POC (OXY) OXYCODONE: NEGATIVE
POC (PCP) PHENCYCLIDINE: NEGATIVE
POC (TCA) TRICYCLIC ANTIDEPRESSANTS: NORMAL
POC TEMPERATURE (URINE): 90
POC THC: NEGATIVE

## 2022-05-16 PROCEDURE — 3074F SYST BP LT 130 MM HG: CPT | Mod: ,,, | Performed by: FAMILY MEDICINE

## 2022-05-16 PROCEDURE — 4010F ACE/ARB THERAPY RXD/TAKEN: CPT | Mod: ,,, | Performed by: FAMILY MEDICINE

## 2022-05-16 PROCEDURE — 99214 PR OFFICE/OUTPT VISIT, EST, LEVL IV, 30-39 MIN: ICD-10-PCS | Mod: ,,, | Performed by: FAMILY MEDICINE

## 2022-05-16 PROCEDURE — 99214 OFFICE O/P EST MOD 30 MIN: CPT | Mod: ,,, | Performed by: FAMILY MEDICINE

## 2022-05-16 PROCEDURE — 3044F HG A1C LEVEL LT 7.0%: CPT | Mod: ,,, | Performed by: FAMILY MEDICINE

## 2022-05-16 PROCEDURE — 3074F PR MOST RECENT SYSTOLIC BLOOD PRESSURE < 130 MM HG: ICD-10-PCS | Mod: ,,, | Performed by: FAMILY MEDICINE

## 2022-05-16 PROCEDURE — 3044F PR MOST RECENT HEMOGLOBIN A1C LEVEL <7.0%: ICD-10-PCS | Mod: ,,, | Performed by: FAMILY MEDICINE

## 2022-05-16 PROCEDURE — 83036 HEMOGLOBIN A1C: ICD-10-PCS | Mod: ,,, | Performed by: CLINICAL MEDICAL LABORATORY

## 2022-05-16 PROCEDURE — 80305 POCT URINE DRUG SCREEN PRESUMP: ICD-10-PCS | Mod: QW,,, | Performed by: FAMILY MEDICINE

## 2022-05-16 PROCEDURE — 80305 DRUG TEST PRSMV DIR OPT OBS: CPT | Mod: QW,,, | Performed by: FAMILY MEDICINE

## 2022-05-16 PROCEDURE — 4010F PR ACE/ARB THEARPY RXD/TAKEN: ICD-10-PCS | Mod: ,,, | Performed by: FAMILY MEDICINE

## 2022-05-16 PROCEDURE — 3008F BODY MASS INDEX DOCD: CPT | Mod: ,,, | Performed by: FAMILY MEDICINE

## 2022-05-16 PROCEDURE — 83036 HEMOGLOBIN GLYCOSYLATED A1C: CPT | Mod: ,,, | Performed by: CLINICAL MEDICAL LABORATORY

## 2022-05-16 PROCEDURE — 3078F DIAST BP <80 MM HG: CPT | Mod: ,,, | Performed by: FAMILY MEDICINE

## 2022-05-16 PROCEDURE — 3008F PR BODY MASS INDEX (BMI) DOCUMENTED: ICD-10-PCS | Mod: ,,, | Performed by: FAMILY MEDICINE

## 2022-05-16 PROCEDURE — 3078F PR MOST RECENT DIASTOLIC BLOOD PRESSURE < 80 MM HG: ICD-10-PCS | Mod: ,,, | Performed by: FAMILY MEDICINE

## 2022-05-16 RX ORDER — DEXTROAMPHETAMINE SACCHARATE, AMPHETAMINE ASPARTATE, DEXTROAMPHETAMINE SULFATE AND AMPHETAMINE SULFATE 7.5; 7.5; 7.5; 7.5 MG/1; MG/1; MG/1; MG/1
1 TABLET ORAL DAILY
Qty: 30 TABLET | Refills: 0 | Status: SHIPPED | OUTPATIENT
Start: 2022-05-16 | End: 2022-05-16 | Stop reason: SDUPTHER

## 2022-05-16 RX ORDER — PRAZOSIN HYDROCHLORIDE 1 MG/1
CAPSULE ORAL
Qty: 90 CAPSULE | Refills: 11 | Status: SHIPPED | OUTPATIENT
Start: 2022-05-16 | End: 2023-02-27

## 2022-05-16 RX ORDER — DEXTROAMPHETAMINE SACCHARATE, AMPHETAMINE ASPARTATE, DEXTROAMPHETAMINE SULFATE AND AMPHETAMINE SULFATE 7.5; 7.5; 7.5; 7.5 MG/1; MG/1; MG/1; MG/1
1 TABLET ORAL DAILY
Qty: 30 TABLET | Refills: 0 | Status: SHIPPED | OUTPATIENT
Start: 2022-05-16 | End: 2022-08-12 | Stop reason: SDUPTHER

## 2022-05-16 RX ORDER — ALPRAZOLAM 0.5 MG/1
0.5 TABLET ORAL 3 TIMES DAILY
Qty: 45 TABLET | Refills: 2 | Status: SHIPPED | OUTPATIENT
Start: 2022-05-16 | End: 2022-06-21

## 2022-05-16 NOTE — PROGRESS NOTES
Josesito Breaux MD        PATIENT NAME: Osmar Brito  : 1970  DATE: 22  MRN: [id number]      Billing Provider: Josesito Breaux MD  Level of Service: AL OFFICE/OUTPT VISIT, EST, LEVL IV, 30-39 MIN  Patient PCP Information     Provider PCP Type    Josesito Breaux MD General          Reason for Visit / Chief Complaint: Follow-up (Followup/) and Insomnia       Update PCP  Update Chief Complaint         History of Present Illness / Problem Focused Workflow     Osmar Brito presents to the clinic with Follow-up (Followup/) and Insomnia     Routine followup.  No significant interval change.  Dr. Vazquez treats him for PTSD.  Has nightmares.        Review of Systems     Review of Systems   Constitutional: Negative for activity change, appetite change, fever and unexpected weight change.   HENT: Negative for congestion, rhinorrhea, sinus pressure, sinus pain, sore throat and trouble swallowing.    Eyes: Negative for photophobia, pain, discharge and visual disturbance.   Respiratory: Negative for cough, chest tightness, wheezing and stridor.    Cardiovascular: Negative for chest pain, palpitations and leg swelling.   Gastrointestinal: Negative for abdominal pain, blood in stool, constipation, diarrhea and nausea.   Endocrine: Negative for polydipsia, polyphagia and polyuria.   Genitourinary: Negative for difficulty urinating, flank pain and hematuria.   Musculoskeletal: Negative for arthralgias and neck pain.   Skin: Negative for rash.   Allergic/Immunologic: Negative for food allergies.   Neurological: Negative for dizziness, tremors, seizures, syncope, weakness (global weakness) and headaches.   Psychiatric/Behavioral: Positive for dysphoric mood and sleep disturbance. Negative for behavioral problems, confusion, decreased concentration and hallucinations. The patient is nervous/anxious.         Medical / Social / Family History     Past Medical History:   Diagnosis Date    Hyperlipidemia     Hypertension      Narcolepsy without cataplexy 05/05/2020    Obesity 10/18/2016    Panic disorder (episodic paroxysmal anxiety) 05/05/2020    Post-traumatic stress disorder, acute 05/05/2020    Type II diabetes mellitus 05/05/2020       No past surgical history on file.    Social History    reports that he has quit smoking. He has never used smokeless tobacco. He reports that he does not drink alcohol and does not use drugs.    Family History  's family history includes Diabetes in his maternal aunt and maternal uncle; Stroke in his mother.    Medications and Allergies     Medications  No outpatient medications have been marked as taking for the 5/16/22 encounter (Office Visit) with Josesito Breaux MD.       Allergies  Review of patient's allergies indicates:  No Known Allergies    Physical Examination     Vitals:    05/16/22 0807   BP: 127/65   Pulse: 73   Resp: 18   Temp: 97.8 °F (36.6 °C)     Physical Exam  Constitutional:       General: He is not in acute distress.     Appearance: Normal appearance.   HENT:      Head: Normocephalic.      Right Ear: Tympanic membrane and ear canal normal.      Left Ear: Tympanic membrane and ear canal normal.      Nose: Nose normal.      Mouth/Throat:      Mouth: Mucous membranes are moist.      Pharynx: No oropharyngeal exudate.   Eyes:      Extraocular Movements: Extraocular movements intact.      Pupils: Pupils are equal, round, and reactive to light.   Cardiovascular:      Rate and Rhythm: Normal rate and regular rhythm.      Heart sounds: No murmur heard.  Pulmonary:      Effort: Pulmonary effort is normal.      Breath sounds: Normal breath sounds. No wheezing.   Abdominal:      General: Abdomen is flat. Bowel sounds are normal.      Palpations: Abdomen is soft.      Hernia: No hernia is present.   Musculoskeletal:         General: Normal range of motion.      Cervical back: Normal range of motion and neck supple.      Right lower leg: No edema.      Left lower leg: No edema.    Lymphadenopathy:      Cervical: No cervical adenopathy.   Skin:     General: Skin is warm and dry.      Coloration: Skin is not jaundiced.      Findings: No lesion.   Neurological:      General: No focal deficit present.      Mental Status: He is alert and oriented to person, place, and time.      Cranial Nerves: No cranial nerve deficit.      Gait: Gait normal.   Psychiatric:         Mood and Affect: Mood normal.         Behavior: Behavior normal.         Judgment: Judgment normal.          Assessment and Plan (including Health Maintenance)      Problem List  Smart Sets  Document Outside HM   :    Plan:   Type 2 diabetes mellitus without complication, without long-term current use of insulin  -     Hemoglobin A1C; Future; Expected date: 05/16/2022    Encounter for long-term (current) use of other medications  -     POCT Urine Drug Screen Presump    Post-traumatic stress disorder, acute  -     prazosin (MINIPRESS) 1 MG Cap; 1 cap po q HS x 7 days; 2 caps po q HS x 7 days, then 3 po q HS  Dispense: 90 capsule; Refill: 11    Panic disorder (episodic paroxysmal anxiety)  -     ALPRAZolam (XANAX) 0.5 MG tablet; Take 1 tablet (0.5 mg total) by mouth 3 (three) times daily.  Dispense: 45 tablet; Refill: 2    Attention deficit disorder (ADD) without hyperactivity  -     Discontinue: dextroamphetamine-amphetamine 30 mg Tab; Take 1 tablet (30 mg total) by mouth once daily.  Dispense: 30 tablet; Refill: 0  -     Discontinue: dextroamphetamine-amphetamine 30 mg Tab; Take 1 tablet (30 mg total) by mouth once daily.  Dispense: 30 tablet; Refill: 0  -     dextroamphetamine-amphetamine 30 mg Tab; Take 1 tablet (30 mg total) by mouth once daily.  Dispense: 30 tablet; Refill: 0           Health Maintenance Due   Topic Date Due    Hepatitis C Screening  Never done    Diabetes Urine Screening  Never done    COVID-19 Vaccine (1) Never done    Eye Exam  Never done    HIV Screening  Never done    TETANUS VACCINE  Never done     Colorectal Cancer Screening  Never done       Problem List Items Addressed This Visit        Psychiatric    Panic disorder (episodic paroxysmal anxiety)    Relevant Medications    ALPRAZolam (XANAX) 0.5 MG tablet    Post-traumatic stress disorder, acute    Relevant Medications    prazosin (MINIPRESS) 1 MG Cap       Endocrine    Type II diabetes mellitus - Primary    Relevant Orders    Hemoglobin A1C (Completed)      Other Visit Diagnoses     Encounter for long-term (current) use of other medications        Relevant Orders    POCT Urine Drug Screen Presump (Completed)    Attention deficit disorder (ADD) without hyperactivity        Relevant Medications    dextroamphetamine-amphetamine 30 mg Tab          Health Maintenance Topics with due status: Not Due       Topic Last Completion Date    Influenza Vaccine 10/01/2015    Low Dose Statin 02/15/2022    Foot Exam 02/15/2022    Lipid Panel 02/15/2022    Hemoglobin A1c 05/16/2022       Future Appointments   Date Time Provider Department Center   6/20/2022 10:15 AM Luis Mosley MD [facility] [place]   8/12/2022  7:30 AM Josesito Breaux MD [facility]   2/17/2023  7:50 AM Josesito Breaux MD [facility]        There are no Patient Instructions on file for this visit.  Follow up in about 3 months (around 8/16/2022) for routine followup.     Signature:  Josesito Breaux MD      Date of encounter: 5/16/22

## 2022-07-07 ENCOUNTER — OFFICE VISIT (OUTPATIENT)
Dept: FAMILY MEDICINE | Facility: CLINIC | Age: 52
End: 2022-07-07
Payer: COMMERCIAL

## 2022-07-07 VITALS
OXYGEN SATURATION: 97 % | SYSTOLIC BLOOD PRESSURE: 128 MMHG | BODY MASS INDEX: 40.94 KG/M2 | HEART RATE: 98 BPM | DIASTOLIC BLOOD PRESSURE: 66 MMHG | RESPIRATION RATE: 17 BRPM | WEIGHT: 286 LBS | HEIGHT: 70 IN

## 2022-07-07 DIAGNOSIS — M17.4 OTHER SECONDARY OSTEOARTHRITIS OF BOTH KNEES: Primary | ICD-10-CM

## 2022-07-07 PROCEDURE — 3044F HG A1C LEVEL LT 7.0%: CPT | Mod: ,,, | Performed by: FAMILY MEDICINE

## 2022-07-07 PROCEDURE — 1159F PR MEDICATION LIST DOCUMENTED IN MEDICAL RECORD: ICD-10-PCS | Mod: ,,, | Performed by: FAMILY MEDICINE

## 2022-07-07 PROCEDURE — 4010F ACE/ARB THERAPY RXD/TAKEN: CPT | Mod: ,,, | Performed by: FAMILY MEDICINE

## 2022-07-07 PROCEDURE — 99212 PR OFFICE/OUTPT VISIT, EST, LEVL II, 10-19 MIN: ICD-10-PCS | Mod: ,,, | Performed by: FAMILY MEDICINE

## 2022-07-07 PROCEDURE — 99212 OFFICE O/P EST SF 10 MIN: CPT | Mod: ,,, | Performed by: FAMILY MEDICINE

## 2022-07-07 PROCEDURE — 4010F PR ACE/ARB THEARPY RXD/TAKEN: ICD-10-PCS | Mod: ,,, | Performed by: FAMILY MEDICINE

## 2022-07-07 PROCEDURE — 1160F RVW MEDS BY RX/DR IN RCRD: CPT | Mod: ,,, | Performed by: FAMILY MEDICINE

## 2022-07-07 PROCEDURE — 3044F PR MOST RECENT HEMOGLOBIN A1C LEVEL <7.0%: ICD-10-PCS | Mod: ,,, | Performed by: FAMILY MEDICINE

## 2022-07-07 PROCEDURE — 3074F PR MOST RECENT SYSTOLIC BLOOD PRESSURE < 130 MM HG: ICD-10-PCS | Mod: ,,, | Performed by: FAMILY MEDICINE

## 2022-07-07 PROCEDURE — 3078F PR MOST RECENT DIASTOLIC BLOOD PRESSURE < 80 MM HG: ICD-10-PCS | Mod: ,,, | Performed by: FAMILY MEDICINE

## 2022-07-07 PROCEDURE — 3008F PR BODY MASS INDEX (BMI) DOCUMENTED: ICD-10-PCS | Mod: ,,, | Performed by: FAMILY MEDICINE

## 2022-07-07 PROCEDURE — 3008F BODY MASS INDEX DOCD: CPT | Mod: ,,, | Performed by: FAMILY MEDICINE

## 2022-07-07 PROCEDURE — 1159F MED LIST DOCD IN RCRD: CPT | Mod: ,,, | Performed by: FAMILY MEDICINE

## 2022-07-07 PROCEDURE — 3074F SYST BP LT 130 MM HG: CPT | Mod: ,,, | Performed by: FAMILY MEDICINE

## 2022-07-07 PROCEDURE — 3078F DIAST BP <80 MM HG: CPT | Mod: ,,, | Performed by: FAMILY MEDICINE

## 2022-07-07 PROCEDURE — 1160F PR REVIEW ALL MEDS BY PRESCRIBER/CLIN PHARMACIST DOCUMENTED: ICD-10-PCS | Mod: ,,, | Performed by: FAMILY MEDICINE

## 2022-07-07 RX ORDER — SILDENAFIL 100 MG/1
100 TABLET, FILM COATED ORAL
COMMUNITY
Start: 2022-05-27 | End: 2022-11-04 | Stop reason: SDUPTHER

## 2022-07-07 RX ORDER — NAPROXEN 500 MG/1
500 TABLET ORAL
COMMUNITY
Start: 2021-09-09 | End: 2023-02-27

## 2022-07-07 RX ORDER — TRAZODONE HYDROCHLORIDE 100 MG/1
1 TABLET ORAL NIGHTLY
COMMUNITY
Start: 2022-05-27 | End: 2023-02-27 | Stop reason: SDUPTHER

## 2022-07-07 NOTE — PROGRESS NOTES
Osmar Brito is a 51 y.o. male seen today for follow-up for his DJD of his knees.  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  [facility] requires [facility].  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      ALPRAZolam (XANAX) 0.5 MG tablet TAKE 1 TABLET BY MOUTH THREE TIMES DAILY FOR ANXIETY 90 tablet 2    atorvastatin (LIPITOR) 10 MG tablet Take 10 mg by mouth once daily.      calcium carbonate/vitamin D3 (CALCIUM WITH VITAMIN D ORAL) Take by mouth. Take one tablet by mouth daily      celecoxib (CELEBREX) 100 MG capsule Take 100 mg by mouth 2 (two) times daily.      dextroamphetamine-amphetamine 30 mg Tab Take 1 tablet (30 mg total) by mouth once daily. 30 tablet 0    hydroCHLOROthiazide (HYDRODIURIL) 12.5 MG Tab Take 12.5 mg by mouth once daily.      losartan (COZAAR) 100 MG tablet Take 100 mg by mouth once daily.      metFORMIN (GLUCOPHAGE-XR) 500 MG ER 24hr tablet Take 500 mg by mouth once daily.       naproxen  (NAPROSYN) 500 MG tablet Take 500 mg by mouth.      prazosin (MINIPRESS) 1 MG Cap 1 cap po q HS x 7 days; 2 caps po q HS x 7 days, then 3 po q HS 90 capsule 11    pregabalin (LYRICA) 100 MG capsule Take 100 mg by mouth 2 (two) times a day.      sildenafiL (VIAGRA) 100 MG tablet Take 100 mg by mouth.      traZODone (DESYREL) 100 MG tablet Take 1 tablet by mouth nightly.       No current facility-administered medications on file prior to visit.       There is no immunization history on file for this patient.    Review of Systems   Constitutional: Negative for fever, malaise/fatigue and weight loss.   Respiratory: Negative for shortness of breath.    Cardiovascular: Negative for chest pain and palpitations.   Gastrointestinal: Negative for nausea and vomiting.   Musculoskeletal: Positive for joint pain.   Psychiatric/Behavioral: Negative for depression.        Vitals:    07/07/22 1421   BP: 128/66   Pulse: 98   Resp: 17       Physical Exam  Eyes:      Conjunctiva/sclera: Conjunctivae normal.      Pupils: Pupils are equal, round, and reactive to light.   Pulmonary:      Effort: Pulmonary effort is normal.   Musculoskeletal:      Right knee: Crepitus present. Decreased range of motion. Tenderness present.      Left knee: Crepitus present. Decreased range of motion. Tenderness present.   Neurological:      Mental Status: He is alert and oriented to person, place, and time.   Psychiatric:         Mood and Affect: Mood normal.         Behavior: Behavior normal.         Thought Content: Thought content normal.         Judgment: Judgment normal.          Assessment and Plan  Other secondary osteoarthritis of both knees            Return to clinic as needed.  We will request his old records to include his x-rays and MRI reports.    Health Maintenance Topics with due status: Not Due       Topic Last Completion Date    Influenza Vaccine 10/01/2015    Foot Exam 02/15/2022    Lipid Panel 02/15/2022    Hemoglobin A1c 05/16/2022     Low Dose Statin 07/07/2022

## 2022-08-12 ENCOUNTER — OFFICE VISIT (OUTPATIENT)
Dept: FAMILY MEDICINE | Facility: CLINIC | Age: 52
End: 2022-08-12
Payer: COMMERCIAL

## 2022-08-12 VITALS
BODY MASS INDEX: 40.94 KG/M2 | SYSTOLIC BLOOD PRESSURE: 120 MMHG | DIASTOLIC BLOOD PRESSURE: 72 MMHG | HEIGHT: 70 IN | WEIGHT: 286 LBS | RESPIRATION RATE: 16 BRPM | HEART RATE: 72 BPM

## 2022-08-12 DIAGNOSIS — Z79.899 ENCOUNTER FOR LONG-TERM (CURRENT) USE OF OTHER MEDICATIONS: ICD-10-CM

## 2022-08-12 DIAGNOSIS — F41.0 PANIC DISORDER (EPISODIC PAROXYSMAL ANXIETY): ICD-10-CM

## 2022-08-12 DIAGNOSIS — F43.11 POST-TRAUMATIC STRESS DISORDER, ACUTE: ICD-10-CM

## 2022-08-12 DIAGNOSIS — F98.8 ATTENTION DEFICIT DISORDER (ADD) WITHOUT HYPERACTIVITY: Primary | ICD-10-CM

## 2022-08-12 LAB
CTP QC/QA: YES
POC (AMP) AMPHETAMINE: ABNORMAL
POC (BAR) BARBITURATES: NEGATIVE
POC (BUP) BUPRENORPHINE: NEGATIVE
POC (BZO) BENZODIAZEPINES: ABNORMAL
POC (COC) COCAINE: NEGATIVE
POC (MDMA) METHYLENEDIOXYMETHAMPHETAMINE 3,4: NEGATIVE
POC (MET) METHAMPHETAMINE: NEGATIVE
POC (MOP) OPIATES: NEGATIVE
POC (MTD) METHADONE: NEGATIVE
POC (OXY) OXYCODONE: NEGATIVE
POC (PCP) PHENCYCLIDINE: NEGATIVE
POC (TCA) TRICYCLIC ANTIDEPRESSANTS: ABNORMAL
POC TEMPERATURE (URINE): 90
POC THC: NEGATIVE

## 2022-08-12 PROCEDURE — 1160F RVW MEDS BY RX/DR IN RCRD: CPT | Mod: ,,, | Performed by: FAMILY MEDICINE

## 2022-08-12 PROCEDURE — 80305 POCT URINE DRUG SCREEN PRESUMP: ICD-10-PCS | Mod: QW,,, | Performed by: FAMILY MEDICINE

## 2022-08-12 PROCEDURE — 1160F PR REVIEW ALL MEDS BY PRESCRIBER/CLIN PHARMACIST DOCUMENTED: ICD-10-PCS | Mod: ,,, | Performed by: FAMILY MEDICINE

## 2022-08-12 PROCEDURE — 3078F PR MOST RECENT DIASTOLIC BLOOD PRESSURE < 80 MM HG: ICD-10-PCS | Mod: ,,, | Performed by: FAMILY MEDICINE

## 2022-08-12 PROCEDURE — 3008F BODY MASS INDEX DOCD: CPT | Mod: ,,, | Performed by: FAMILY MEDICINE

## 2022-08-12 PROCEDURE — 99213 PR OFFICE/OUTPT VISIT, EST, LEVL III, 20-29 MIN: ICD-10-PCS | Mod: ,,, | Performed by: FAMILY MEDICINE

## 2022-08-12 PROCEDURE — 4010F PR ACE/ARB THEARPY RXD/TAKEN: ICD-10-PCS | Mod: ,,, | Performed by: FAMILY MEDICINE

## 2022-08-12 PROCEDURE — 1159F PR MEDICATION LIST DOCUMENTED IN MEDICAL RECORD: ICD-10-PCS | Mod: ,,, | Performed by: FAMILY MEDICINE

## 2022-08-12 PROCEDURE — 4010F ACE/ARB THERAPY RXD/TAKEN: CPT | Mod: ,,, | Performed by: FAMILY MEDICINE

## 2022-08-12 PROCEDURE — 1159F MED LIST DOCD IN RCRD: CPT | Mod: ,,, | Performed by: FAMILY MEDICINE

## 2022-08-12 PROCEDURE — 3074F SYST BP LT 130 MM HG: CPT | Mod: ,,, | Performed by: FAMILY MEDICINE

## 2022-08-12 PROCEDURE — 3074F PR MOST RECENT SYSTOLIC BLOOD PRESSURE < 130 MM HG: ICD-10-PCS | Mod: ,,, | Performed by: FAMILY MEDICINE

## 2022-08-12 PROCEDURE — 3044F PR MOST RECENT HEMOGLOBIN A1C LEVEL <7.0%: ICD-10-PCS | Mod: ,,, | Performed by: FAMILY MEDICINE

## 2022-08-12 PROCEDURE — 80305 DRUG TEST PRSMV DIR OPT OBS: CPT | Mod: QW,,, | Performed by: FAMILY MEDICINE

## 2022-08-12 PROCEDURE — 3008F PR BODY MASS INDEX (BMI) DOCUMENTED: ICD-10-PCS | Mod: ,,, | Performed by: FAMILY MEDICINE

## 2022-08-12 PROCEDURE — 3044F HG A1C LEVEL LT 7.0%: CPT | Mod: ,,, | Performed by: FAMILY MEDICINE

## 2022-08-12 PROCEDURE — 3078F DIAST BP <80 MM HG: CPT | Mod: ,,, | Performed by: FAMILY MEDICINE

## 2022-08-12 PROCEDURE — 99213 OFFICE O/P EST LOW 20 MIN: CPT | Mod: ,,, | Performed by: FAMILY MEDICINE

## 2022-08-12 RX ORDER — ALPRAZOLAM 0.5 MG/1
0.5 TABLET ORAL 3 TIMES DAILY
Qty: 90 TABLET | Refills: 2 | Status: SHIPPED | OUTPATIENT
Start: 2022-08-12 | End: 2022-11-04 | Stop reason: SDUPTHER

## 2022-08-12 RX ORDER — DEXTROAMPHETAMINE SACCHARATE, AMPHETAMINE ASPARTATE, DEXTROAMPHETAMINE SULFATE AND AMPHETAMINE SULFATE 7.5; 7.5; 7.5; 7.5 MG/1; MG/1; MG/1; MG/1
1 TABLET ORAL DAILY
Qty: 30 TABLET | Refills: 0 | Status: SHIPPED | OUTPATIENT
Start: 2022-08-12 | End: 2022-11-04 | Stop reason: SDUPTHER

## 2022-08-12 RX ORDER — DEXTROAMPHETAMINE SACCHARATE, AMPHETAMINE ASPARTATE, DEXTROAMPHETAMINE SULFATE AND AMPHETAMINE SULFATE 7.5; 7.5; 7.5; 7.5 MG/1; MG/1; MG/1; MG/1
1 TABLET ORAL DAILY
Qty: 30 TABLET | Refills: 0 | Status: SHIPPED | OUTPATIENT
Start: 2022-08-12 | End: 2022-08-12 | Stop reason: SDUPTHER

## 2022-08-12 NOTE — PROGRESS NOTES
Josesito Breaux MD        PATIENT NAME: Osmar Brito  : 1970  DATE: 22  MRN: [id number]      Billing Provider: Josesito Breaux MD  Level of Service: [place] OFFICE/OUTPT VISIT, EST, LEVL III, 20-29 MIN  Patient PCP Information     Provider PCP Type    Luis Mosley MD General          Reason for Visit / Chief Complaint: ADHD (3 month check.)       Update PCP  Update Chief Complaint         History of Present Illness / Problem Focused Workflow     Osmar Brito presents to the clinic with ADHD (3 month check.)     Routine followup.  No significant interval change      Review of Systems     Review of Systems   Constitutional: Negative for activity change, appetite change, fever and unexpected weight change.   HENT: Negative for congestion, rhinorrhea, sinus pressure, sinus pain, sore throat and trouble swallowing.    Eyes: Negative for photophobia, pain, discharge and visual disturbance.   Respiratory: Negative for cough, chest tightness, wheezing and stridor.    Cardiovascular: Negative for chest pain, palpitations and leg swelling.   Gastrointestinal: Negative for abdominal pain, blood in stool, constipation, diarrhea and nausea.   Endocrine: Negative for polydipsia, polyphagia and polyuria.   Genitourinary: Negative for difficulty urinating, flank pain and hematuria.   Musculoskeletal: Negative for arthralgias and neck pain.   Skin: Negative for rash.   Allergic/Immunologic: Negative for food allergies.   Neurological: Negative for dizziness, tremors, seizures, syncope, weakness (global weakness) and headaches.   Psychiatric/Behavioral: Positive for decreased concentration and dysphoric mood. Negative for behavioral problems, confusion and hallucinations. The patient is nervous/anxious.         Medical / Social / Family History     Past Medical History:   Diagnosis Date    Hyperlipidemia     Hypertension     Narcolepsy without cataplexy 2020    Obesity 10/18/2016    Panic disorder (episodic  paroxysmal anxiety) 05/05/2020    Post-traumatic stress disorder, acute 05/05/2020    Type II diabetes mellitus 05/05/2020       History reviewed. No pertinent surgical history.    Social History    reports that he has been smoking vaping with nicotine. He has never used smokeless tobacco. He reports that he does not drink alcohol and does not use drugs.    Family History  's family history includes Diabetes in his maternal aunt and maternal uncle; Stroke in his mother.    Medications and Allergies     Medications  No outpatient medications have been marked as taking for the 8/12/22 encounter (Office Visit) with Josesito Breaux MD.       Allergies  Review of patient's allergies indicates:  No Known Allergies    Physical Examination     Vitals:    08/12/22 0738   BP: 120/72   Pulse: 72   Resp: 16     Physical Exam  Constitutional:       General: He is not in acute distress.     Appearance: Normal appearance.   HENT:      Head: Normocephalic.      Right Ear: Tympanic membrane and ear canal normal.      Left Ear: Tympanic membrane and ear canal normal.      Nose: Nose normal.      Mouth/Throat:      Mouth: Mucous membranes are moist.      Pharynx: No oropharyngeal exudate.   Eyes:      Extraocular Movements: Extraocular movements intact.      Pupils: Pupils are equal, round, and reactive to light.   Cardiovascular:      Rate and Rhythm: Normal rate and regular rhythm.      Heart sounds: No murmur heard.  Pulmonary:      Effort: Pulmonary effort is normal.      Breath sounds: Normal breath sounds. No wheezing.   Abdominal:      General: Abdomen is flat. Bowel sounds are normal.      Palpations: Abdomen is soft.      Hernia: No hernia is present.   Musculoskeletal:         General: Normal range of motion.      Cervical back: Normal range of motion and neck supple.      Right lower leg: No edema.      Left lower leg: No edema.   Lymphadenopathy:      Cervical: No cervical adenopathy.   Skin:     General: Skin is  warm and dry.      Coloration: Skin is not jaundiced.      Findings: No lesion.   Neurological:      General: No focal deficit present.      Mental Status: He is alert and oriented to person, place, and time.      Cranial Nerves: No cranial nerve deficit.      Gait: Gait normal.   Psychiatric:         Mood and Affect: Mood normal.         Behavior: Behavior normal.         Judgment: Judgment normal.          Assessment and Plan (including Health Maintenance)      Problem List  Smart Sets  Document Outside HM   :    Plan:   Attention deficit disorder (ADD) without hyperactivity  -     Discontinue: dextroamphetamine-amphetamine 30 mg Tab; Take 1 tablet (30 mg total) by mouth once daily.  Dispense: 30 tablet; Refill: 0  -     Discontinue: dextroamphetamine-amphetamine 30 mg Tab; Take 1 tablet (30 mg total) by mouth once daily.  Dispense: 30 tablet; Refill: 0  -     dextroamphetamine-amphetamine 30 mg Tab; Take 1 tablet (30 mg total) by mouth once daily.  Dispense: 30 tablet; Refill: 0    Encounter for long-term (current) use of other medications  -     POCT Urine Drug Screen Presump    Panic disorder (episodic paroxysmal anxiety)  -     ALPRAZolam (XANAX) 0.5 MG tablet; Take 1 tablet (0.5 mg total) by mouth 3 (three) times daily.  Dispense: 90 tablet; Refill: 2    Post-traumatic stress disorder, acute           Health Maintenance Due   Topic Date Due    Hepatitis C Screening  Never done    COVID-19 Vaccine (1) Never done    Diabetes Urine Screening  Never done    Pneumococcal Vaccines (Age 0-64) (1 - PCV) Never done    Eye Exam  Never done    HIV Screening  Never done    TETANUS VACCINE  Never done    Colorectal Cancer Screening  Never done       Problem List Items Addressed This Visit        Psychiatric    Panic disorder (episodic paroxysmal anxiety)    Relevant Medications    ALPRAZolam (XANAX) 0.5 MG tablet    Post-traumatic stress disorder, acute      Other Visit Diagnoses     Attention deficit disorder  (ADD) without hyperactivity    -  Primary    Relevant Medications    dextroamphetamine-amphetamine 30 mg Tab    Encounter for long-term (current) use of other medications        Relevant Orders    POCT Urine Drug Screen Presump (Completed)          Health Maintenance Topics with due status: Not Due       Topic Last Completion Date    Foot Exam 02/15/2022    Lipid Panel 02/15/2022    Hemoglobin A1c 05/16/2022    Low Dose Statin 07/07/2022    Influenza Vaccine Not Due       Future Appointments   Date Time Provider Department Center   11/4/2022  3:00 PM [facility]   2/17/2023  7:50 AM Josesito Breaux MD [facility]        There are no Patient Instructions on file for this visit.  Follow up in about 3 months (around 11/12/2022) for routine followup.     Signature:  Josesito Breaux MD      Date of encounter: 8/12/22

## 2022-11-04 ENCOUNTER — OFFICE VISIT (OUTPATIENT)
Dept: FAMILY MEDICINE | Facility: CLINIC | Age: 52
End: 2022-11-04
Payer: COMMERCIAL

## 2022-11-04 VITALS
SYSTOLIC BLOOD PRESSURE: 136 MMHG | DIASTOLIC BLOOD PRESSURE: 64 MMHG | BODY MASS INDEX: 38.8 KG/M2 | HEART RATE: 88 BPM | WEIGHT: 271 LBS | RESPIRATION RATE: 16 BRPM | HEIGHT: 70 IN

## 2022-11-04 DIAGNOSIS — Z11.59 ENCOUNTER FOR HEPATITIS C SCREENING TEST FOR LOW RISK PATIENT: ICD-10-CM

## 2022-11-04 DIAGNOSIS — I10 PRIMARY HYPERTENSION: ICD-10-CM

## 2022-11-04 DIAGNOSIS — N52.9 ERECTILE DYSFUNCTION, UNSPECIFIED ERECTILE DYSFUNCTION TYPE: ICD-10-CM

## 2022-11-04 DIAGNOSIS — Z79.899 ENCOUNTER FOR LONG-TERM (CURRENT) USE OF OTHER MEDICATIONS: ICD-10-CM

## 2022-11-04 DIAGNOSIS — E11.9 TYPE 2 DIABETES MELLITUS WITHOUT COMPLICATION, WITHOUT LONG-TERM CURRENT USE OF INSULIN: Primary | ICD-10-CM

## 2022-11-04 DIAGNOSIS — F98.8 ATTENTION DEFICIT DISORDER (ADD) WITHOUT HYPERACTIVITY: ICD-10-CM

## 2022-11-04 DIAGNOSIS — F41.0 PANIC DISORDER (EPISODIC PAROXYSMAL ANXIETY): ICD-10-CM

## 2022-11-04 LAB
ALBUMIN SERPL BCP-MCNC: 3.4 G/DL (ref 3.5–5)
ALBUMIN/GLOB SERPL: 1 {RATIO}
ALP SERPL-CCNC: 97 U/L (ref 45–115)
ALT SERPL W P-5'-P-CCNC: 52 U/L (ref 16–61)
ANION GAP SERPL CALCULATED.3IONS-SCNC: 10 MMOL/L (ref 7–16)
AST SERPL W P-5'-P-CCNC: 22 U/L (ref 15–37)
BASOPHILS # BLD AUTO: 0.04 K/UL (ref 0–0.2)
BASOPHILS NFR BLD AUTO: 0.5 % (ref 0–1)
BILIRUB SERPL-MCNC: 0.4 MG/DL (ref ?–1.2)
BUN SERPL-MCNC: 19 MG/DL (ref 7–18)
BUN/CREAT SERPL: 17 (ref 6–20)
CALCIUM SERPL-MCNC: 8.6 MG/DL (ref 8.5–10.1)
CHLORIDE SERPL-SCNC: 104 MMOL/L (ref 98–107)
CHOLEST SERPL-MCNC: 177 MG/DL (ref 0–200)
CHOLEST/HDLC SERPL: 2.2 {RATIO}
CO2 SERPL-SCNC: 29 MMOL/L (ref 21–32)
CREAT SERPL-MCNC: 1.13 MG/DL (ref 0.7–1.3)
CTP QC/QA: YES
DIFFERENTIAL METHOD BLD: ABNORMAL
EGFR (NO RACE VARIABLE) (RUSH/TITUS): 79 ML/MIN/1.73M²
EOSINOPHIL # BLD AUTO: 0.11 K/UL (ref 0–0.5)
EOSINOPHIL NFR BLD AUTO: 1.4 % (ref 1–4)
ERYTHROCYTE [DISTWIDTH] IN BLOOD BY AUTOMATED COUNT: 14.4 % (ref 11.5–14.5)
EST. AVERAGE GLUCOSE BLD GHB EST-MCNC: 114 MG/DL
GLOBULIN SER-MCNC: 3.4 G/DL (ref 2–4)
GLUCOSE SERPL-MCNC: 170 MG/DL (ref 74–106)
HBA1C MFR BLD HPLC: 6 % (ref 4.5–6.6)
HCT VFR BLD AUTO: 44.2 % (ref 40–54)
HDLC SERPL-MCNC: 79 MG/DL (ref 40–60)
HGB BLD-MCNC: 14.6 G/DL (ref 13.5–18)
IMM GRANULOCYTES # BLD AUTO: 0.03 K/UL (ref 0–0.04)
IMM GRANULOCYTES NFR BLD: 0.4 % (ref 0–0.4)
LDLC SERPL CALC-MCNC: 73 MG/DL
LYMPHOCYTES # BLD AUTO: 1.38 K/UL (ref 1–4.8)
LYMPHOCYTES NFR BLD AUTO: 17.3 % (ref 27–41)
MCH RBC QN AUTO: 28.3 PG (ref 27–31)
MCHC RBC AUTO-ENTMCNC: 33 G/DL (ref 32–36)
MCV RBC AUTO: 85.8 FL (ref 80–96)
MONOCYTES # BLD AUTO: 0.27 K/UL (ref 0–0.8)
MONOCYTES NFR BLD AUTO: 3.4 % (ref 2–6)
MPC BLD CALC-MCNC: 10.5 FL (ref 9.4–12.4)
NEUTROPHILS # BLD AUTO: 6.16 K/UL (ref 1.8–7.7)
NEUTROPHILS NFR BLD AUTO: 77 % (ref 53–65)
NONHDLC SERPL-MCNC: 98 MG/DL
NRBC # BLD AUTO: 0 X10E3/UL
NRBC, AUTO (.00): 0 %
PLATELET # BLD AUTO: 232 K/UL (ref 150–400)
POC (AMP) AMPHETAMINE: ABNORMAL
POC (BAR) BARBITURATES: NEGATIVE
POC (BUP) BUPRENORPHINE: NEGATIVE
POC (BZO) BENZODIAZEPINES: ABNORMAL
POC (COC) COCAINE: NEGATIVE
POC (MDMA) METHYLENEDIOXYMETHAMPHETAMINE 3,4: NEGATIVE
POC (MET) METHAMPHETAMINE: NEGATIVE
POC (MOP) OPIATES: NEGATIVE
POC (MTD) METHADONE: NEGATIVE
POC (OXY) OXYCODONE: NEGATIVE
POC (PCP) PHENCYCLIDINE: NEGATIVE
POC (TCA) TRICYCLIC ANTIDEPRESSANTS: ABNORMAL
POC TEMPERATURE (URINE): 92
POC THC: NEGATIVE
POTASSIUM SERPL-SCNC: 3.8 MMOL/L (ref 3.5–5.1)
PROT SERPL-MCNC: 6.8 G/DL (ref 6.4–8.2)
RBC # BLD AUTO: 5.15 M/UL (ref 4.6–6.2)
SODIUM SERPL-SCNC: 139 MMOL/L (ref 136–145)
TRIGL SERPL-MCNC: 127 MG/DL (ref 35–150)
VLDLC SERPL-MCNC: 25 MG/DL
WBC # BLD AUTO: 7.99 K/UL (ref 4.5–11)

## 2022-11-04 PROCEDURE — 3008F PR BODY MASS INDEX (BMI) DOCUMENTED: ICD-10-PCS | Mod: ,,, | Performed by: FAMILY MEDICINE

## 2022-11-04 PROCEDURE — 3078F PR MOST RECENT DIASTOLIC BLOOD PRESSURE < 80 MM HG: ICD-10-PCS | Mod: ,,, | Performed by: FAMILY MEDICINE

## 2022-11-04 PROCEDURE — 3078F DIAST BP <80 MM HG: CPT | Mod: ,,, | Performed by: FAMILY MEDICINE

## 2022-11-04 PROCEDURE — 80053 COMPREHEN METABOLIC PANEL: CPT | Mod: ,,, | Performed by: CLINICAL MEDICAL LABORATORY

## 2022-11-04 PROCEDURE — 80061 LIPID PANEL: CPT | Mod: ,,, | Performed by: CLINICAL MEDICAL LABORATORY

## 2022-11-04 PROCEDURE — 83036 HEMOGLOBIN A1C: ICD-10-PCS | Mod: ,,, | Performed by: CLINICAL MEDICAL LABORATORY

## 2022-11-04 PROCEDURE — 99214 OFFICE O/P EST MOD 30 MIN: CPT | Mod: ,,, | Performed by: FAMILY MEDICINE

## 2022-11-04 PROCEDURE — 80053 COMPREHENSIVE METABOLIC PANEL: ICD-10-PCS | Mod: ,,, | Performed by: CLINICAL MEDICAL LABORATORY

## 2022-11-04 PROCEDURE — 80305 DRUG TEST PRSMV DIR OPT OBS: CPT | Mod: QW,,, | Performed by: FAMILY MEDICINE

## 2022-11-04 PROCEDURE — 99214 PR OFFICE/OUTPT VISIT, EST, LEVL IV, 30-39 MIN: ICD-10-PCS | Mod: ,,, | Performed by: FAMILY MEDICINE

## 2022-11-04 PROCEDURE — 4010F ACE/ARB THERAPY RXD/TAKEN: CPT | Mod: ,,, | Performed by: FAMILY MEDICINE

## 2022-11-04 PROCEDURE — 3075F SYST BP GE 130 - 139MM HG: CPT | Mod: ,,, | Performed by: FAMILY MEDICINE

## 2022-11-04 PROCEDURE — 1160F PR REVIEW ALL MEDS BY PRESCRIBER/CLIN PHARMACIST DOCUMENTED: ICD-10-PCS | Mod: ,,, | Performed by: FAMILY MEDICINE

## 2022-11-04 PROCEDURE — 3008F BODY MASS INDEX DOCD: CPT | Mod: ,,, | Performed by: FAMILY MEDICINE

## 2022-11-04 PROCEDURE — 3044F HG A1C LEVEL LT 7.0%: CPT | Mod: ,,, | Performed by: FAMILY MEDICINE

## 2022-11-04 PROCEDURE — 80305 POCT URINE DRUG SCREEN PRESUMP: ICD-10-PCS | Mod: QW,,, | Performed by: FAMILY MEDICINE

## 2022-11-04 PROCEDURE — 80061 LIPID PANEL: ICD-10-PCS | Mod: ,,, | Performed by: CLINICAL MEDICAL LABORATORY

## 2022-11-04 PROCEDURE — 1160F RVW MEDS BY RX/DR IN RCRD: CPT | Mod: ,,, | Performed by: FAMILY MEDICINE

## 2022-11-04 PROCEDURE — 4010F PR ACE/ARB THEARPY RXD/TAKEN: ICD-10-PCS | Mod: ,,, | Performed by: FAMILY MEDICINE

## 2022-11-04 PROCEDURE — 1159F MED LIST DOCD IN RCRD: CPT | Mod: ,,, | Performed by: FAMILY MEDICINE

## 2022-11-04 PROCEDURE — 85025 COMPLETE CBC W/AUTO DIFF WBC: CPT | Mod: ,,, | Performed by: CLINICAL MEDICAL LABORATORY

## 2022-11-04 PROCEDURE — 83036 HEMOGLOBIN GLYCOSYLATED A1C: CPT | Mod: ,,, | Performed by: CLINICAL MEDICAL LABORATORY

## 2022-11-04 PROCEDURE — 3044F PR MOST RECENT HEMOGLOBIN A1C LEVEL <7.0%: ICD-10-PCS | Mod: ,,, | Performed by: FAMILY MEDICINE

## 2022-11-04 PROCEDURE — 3075F PR MOST RECENT SYSTOLIC BLOOD PRESS GE 130-139MM HG: ICD-10-PCS | Mod: ,,, | Performed by: FAMILY MEDICINE

## 2022-11-04 PROCEDURE — 1159F PR MEDICATION LIST DOCUMENTED IN MEDICAL RECORD: ICD-10-PCS | Mod: ,,, | Performed by: FAMILY MEDICINE

## 2022-11-04 PROCEDURE — 85025 CBC WITH DIFFERENTIAL: ICD-10-PCS | Mod: ,,, | Performed by: CLINICAL MEDICAL LABORATORY

## 2022-11-04 RX ORDER — DEXTROAMPHETAMINE SACCHARATE, AMPHETAMINE ASPARTATE, DEXTROAMPHETAMINE SULFATE AND AMPHETAMINE SULFATE 7.5; 7.5; 7.5; 7.5 MG/1; MG/1; MG/1; MG/1
1 TABLET ORAL 2 TIMES DAILY
Qty: 60 TABLET | Refills: 0 | Status: SHIPPED | OUTPATIENT
Start: 2022-11-04 | End: 2022-11-04 | Stop reason: SDUPTHER

## 2022-11-04 RX ORDER — ALPRAZOLAM 0.5 MG/1
0.5 TABLET ORAL 3 TIMES DAILY
Qty: 90 TABLET | Refills: 2 | Status: SHIPPED | OUTPATIENT
Start: 2022-11-04 | End: 2023-02-27 | Stop reason: SDUPTHER

## 2022-11-04 RX ORDER — SILDENAFIL 100 MG/1
100 TABLET, FILM COATED ORAL
Qty: 10 TABLET | Refills: 11 | Status: SHIPPED | OUTPATIENT
Start: 2022-11-04 | End: 2023-02-27 | Stop reason: SDUPTHER

## 2022-11-04 RX ORDER — DEXTROAMPHETAMINE SACCHARATE, AMPHETAMINE ASPARTATE, DEXTROAMPHETAMINE SULFATE AND AMPHETAMINE SULFATE 7.5; 7.5; 7.5; 7.5 MG/1; MG/1; MG/1; MG/1
1 TABLET ORAL 2 TIMES DAILY
Qty: 60 TABLET | Refills: 0 | Status: SHIPPED | OUTPATIENT
Start: 2022-11-04 | End: 2023-02-27 | Stop reason: SDUPTHER

## 2022-11-04 RX ORDER — DEXTROAMPHETAMINE SACCHARATE, AMPHETAMINE ASPARTATE, DEXTROAMPHETAMINE SULFATE AND AMPHETAMINE SULFATE 7.5; 7.5; 7.5; 7.5 MG/1; MG/1; MG/1; MG/1
1 TABLET ORAL DAILY
Qty: 30 TABLET | Refills: 0 | Status: SHIPPED | OUTPATIENT
Start: 2022-11-04 | End: 2022-11-04 | Stop reason: SDUPTHER

## 2022-11-04 NOTE — PROGRESS NOTES
Josesito Breaux MD        PATIENT NAME: Osmar Brito  : 1970  DATE: 22  MRN: [id number]      Billing Provider: Josesito Breaux MD  Level of Service: [place] OFFICE/OUTPT VISIT, EST, LEVL IV, 30-39 MIN  Patient PCP Information       Provider PCP Type    Luis Mosley MD General            Reason for Visit / Chief Complaint: ADHD (3 month med check.) and Diabetes (3 month check.)       Update PCP  Update Chief Complaint         History of Present Illness / Problem Focused Workflow     Osmar Brito presents to the clinic with ADHD (3 month med check.) and Diabetes (3 month check.)     Routine followup.  No significant interval change.      Diabetes  Pertinent negatives for hypoglycemia include no confusion, dizziness, headaches, nervousness/anxiousness, seizures or tremors. Pertinent negatives for diabetes include no chest pain, no polydipsia, no polyphagia, no polyuria and no weakness.     Review of Systems     Review of Systems   Constitutional:  Negative for activity change, appetite change, fever and unexpected weight change.   HENT:  Negative for congestion, hearing loss, rhinorrhea, sinus pressure, sinus pain, sore throat and trouble swallowing.    Eyes:  Negative for photophobia, pain, discharge and visual disturbance.   Respiratory:  Negative for cough, chest tightness, wheezing and stridor.    Cardiovascular:  Negative for chest pain, palpitations and leg swelling.   Gastrointestinal:  Negative for abdominal pain, blood in stool, constipation, diarrhea, nausea and vomiting.   Endocrine: Negative for polydipsia, polyphagia and polyuria.   Genitourinary:  Negative for difficulty urinating, flank pain, hematuria and urgency.   Musculoskeletal:  Negative for arthralgias, joint swelling and neck pain.   Skin:  Negative for rash.   Allergic/Immunologic: Negative for food allergies.   Neurological:  Negative for dizziness, tremors, seizures, syncope, weakness and headaches.   Psychiatric/Behavioral:   Positive for decreased concentration. Negative for behavioral problems, confusion, dysphoric mood and hallucinations. The patient is not nervous/anxious.       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 No pertinent surgical history.    Social History    reports that he has been smoking vaping with nicotine. He has never used smokeless tobacco. He reports that he does not drink alcohol and does not use drugs.    Family History  's family history includes Diabetes in his maternal aunt and maternal uncle; Stroke in his mother.    Medications and Allergies     Medications  No outpatient medications have been marked as taking for the 11/4/22 encounter (Office Visit) with Josesito Breaux MD.       Allergies  Review of patient's allergies indicates:  No Known Allergies    Physical Examination     Vitals:    11/04/22 1503   BP: 136/64   Pulse: 88   Resp: 16     Physical Exam  Constitutional:       General: He is not in acute distress.     Appearance: Normal appearance.   HENT:      Head: Normocephalic.      Right Ear: Tympanic membrane and ear canal normal.      Left Ear: Tympanic membrane and ear canal normal.      Nose: Nose normal.      Mouth/Throat:      Mouth: Mucous membranes are moist.      Pharynx: No oropharyngeal exudate.   Eyes:      Extraocular Movements: Extraocular movements intact.      Pupils: Pupils are equal, round, and reactive to light.   Cardiovascular:      Rate and Rhythm: Normal rate and regular rhythm.      Heart sounds: No murmur heard.  Pulmonary:      Effort: Pulmonary effort is normal.      Breath sounds: Normal breath sounds. No wheezing.   Abdominal:      General: Abdomen is flat.  Bowel sounds are normal.      Palpations: Abdomen is soft.      Hernia: No hernia is present.   Musculoskeletal:         General: Normal range of motion.      Cervical back: Normal range of motion and neck supple.      Right lower leg: No edema.      Left lower leg: No edema.   Lymphadenopathy:      Cervical: No cervical adenopathy.   Skin:     General: Skin is warm and dry.      Coloration: Skin is not jaundiced.      Findings: No lesion.   Neurological:      General: No focal deficit present.      Mental Status: He is alert and oriented to person, place, and time.      Cranial Nerves: No cranial nerve deficit.      Gait: Gait normal.   Psychiatric:         Mood and Affect: Mood normal.         Behavior: Behavior normal.         Judgment: Judgment normal.        Assessment and Plan (including Health Maintenance)      Problem List  Smart Sets  Document Outside HM   :    Plan:   Type 2 diabetes mellitus without complication, without long-term current use of insulin  -     Hemoglobin A1C  -     Protein/Creatinine Ratio, Urine    Encounter for long-term (current) use of other medications  -     POCT Urine Drug Screen Presump    Primary hypertension  -     CBC Auto Differential  -     Comprehensive Metabolic Panel  -     Lipid Panel    Panic disorder (episodic paroxysmal anxiety)  -     ALPRAZolam (XANAX) 0.5 MG tablet; Take 1 tablet (0.5 mg total) by mouth 3 (three) times daily.  Dispense: 90 tablet; Refill: 2    Attention deficit disorder (ADD) without hyperactivity  -     Discontinue: dextroamphetamine-amphetamine 30 mg Tab; Take 1 tablet (30 mg total) by mouth once daily.  Dispense: 30 tablet; Refill: 0  -     Discontinue: dextroamphetamine-amphetamine 30 mg Tab; Take 1 tablet (30 mg total) by mouth once daily.  Dispense: 30 tablet; Refill: 0  -     Discontinue: dextroamphetamine-amphetamine 30 mg Tab; Take 1 tablet (30 mg total) by mouth 2 (two) times a day.  Dispense: 60 tablet; Refill: 0  -     Discontinue:  dextroamphetamine-amphetamine 30 mg Tab; Take 1 tablet (30 mg total) by mouth 2 (two) times a day.  Dispense: 60 tablet; Refill: 0  -     dextroamphetamine-amphetamine 30 mg Tab; Take 1 tablet (30 mg total) by mouth 2 (two) times a day.  Dispense: 60 tablet; Refill: 0    Encounter for hepatitis C screening test for low risk patient  -     Hepatitis C Antibody; Future; Expected date: 11/04/2022    Erectile dysfunction, unspecified erectile dysfunction type  -     sildenafiL (VIAGRA) 100 MG tablet; Take 1 tablet (100 mg total) by mouth as needed for Erectile Dysfunction.  Dispense: 10 tablet; Refill: 11         Health Maintenance Due   Topic Date Due    Hepatitis C Screening  Never done    Pneumococcal Vaccines (Age 0-64) (1 - PCV) Never done    HIV Screening  Never done    TETANUS VACCINE  Never done    Shingles Vaccine (1 of 2) Never done    Colorectal Cancer Screening  12/13/2022       Problem List Items Addressed This Visit          Psychiatric    Panic disorder (episodic paroxysmal anxiety)    Relevant Medications    ALPRAZolam (XANAX) 0.5 MG tablet       Cardiac/Vascular    Hypertension       Endocrine    Type II diabetes mellitus - Primary    Relevant Orders    Protein/Creatinine Ratio, Urine     Other Visit Diagnoses       Encounter for long-term (current) use of other medications        Attention deficit disorder (ADD) without hyperactivity        Relevant Medications    dextroamphetamine-amphetamine 30 mg Tab    Encounter for hepatitis C screening test for low risk patient        Relevant Orders    Hepatitis C Antibody    Erectile dysfunction, unspecified erectile dysfunction type        Relevant Medications    sildenafiL (VIAGRA) 100 MG tablet            Health Maintenance Topics with due status: Not Due       Topic Last Completion Date    Foot Exam 02/15/2022    Low Dose Statin 07/07/2022    Lipid Panel 11/04/2022    Hemoglobin A1c 11/04/2022       Future Appointments   Date Time Provider Department Center    2/17/2023  7:50 AM Josesito Breaux MD [facility]        There are no Patient Instructions on file for this visit.  Follow up in about 3 months (around 2/4/2023) for routine followup.     Signature:  Josesito Breaux MD      Date of encounter: 11/4/22

## 2023-02-27 ENCOUNTER — OFFICE VISIT (OUTPATIENT)
Dept: FAMILY MEDICINE | Facility: CLINIC | Age: 53
End: 2023-02-27
Payer: COMMERCIAL

## 2023-02-27 VITALS
BODY MASS INDEX: 38.31 KG/M2 | SYSTOLIC BLOOD PRESSURE: 140 MMHG | HEART RATE: 95 BPM | HEIGHT: 70 IN | DIASTOLIC BLOOD PRESSURE: 60 MMHG | WEIGHT: 267.63 LBS | RESPIRATION RATE: 18 BRPM | OXYGEN SATURATION: 97 %

## 2023-02-27 DIAGNOSIS — Z79.899 ENCOUNTER FOR LONG-TERM (CURRENT) USE OF OTHER MEDICATIONS: ICD-10-CM

## 2023-02-27 DIAGNOSIS — Z12.5 PROSTATE CANCER SCREENING: ICD-10-CM

## 2023-02-27 DIAGNOSIS — F98.8 ATTENTION DEFICIT DISORDER (ADD) WITHOUT HYPERACTIVITY: ICD-10-CM

## 2023-02-27 DIAGNOSIS — Z13.1 SCREENING FOR DIABETES MELLITUS: Primary | ICD-10-CM

## 2023-02-27 DIAGNOSIS — F41.0 PANIC DISORDER (EPISODIC PAROXYSMAL ANXIETY): ICD-10-CM

## 2023-02-27 DIAGNOSIS — Z00.00 ROUTINE GENERAL MEDICAL EXAMINATION AT A HEALTH CARE FACILITY: ICD-10-CM

## 2023-02-27 DIAGNOSIS — Z13.220 SCREENING FOR LIPOID DISORDERS: ICD-10-CM

## 2023-02-27 DIAGNOSIS — N52.9 ERECTILE DYSFUNCTION, UNSPECIFIED ERECTILE DYSFUNCTION TYPE: ICD-10-CM

## 2023-02-27 LAB
CHOLEST SERPL-MCNC: 161 MG/DL (ref 0–200)
CHOLEST/HDLC SERPL: 2.1 {RATIO}
CTP QC/QA: YES
GLUCOSE SERPL-MCNC: 121 MG/DL (ref 74–106)
HDLC SERPL-MCNC: 75 MG/DL (ref 40–60)
LDLC SERPL CALC-MCNC: 72 MG/DL
LDLC/HDLC SERPL: 1 {RATIO}
NONHDLC SERPL-MCNC: 86 MG/DL
POC (AMP) AMPHETAMINE: NEGATIVE
POC (BAR) BARBITURATES: NEGATIVE
POC (BUP) BUPRENORPHINE: NEGATIVE
POC (BZO) BENZODIAZEPINES: NEGATIVE
POC (COC) COCAINE: NEGATIVE
POC (MDMA) METHYLENEDIOXYMETHAMPHETAMINE 3,4: NEGATIVE
POC (MET) METHAMPHETAMINE: NEGATIVE
POC (MOP) OPIATES: NEGATIVE
POC (MTD) METHADONE: NEGATIVE
POC (OXY) OXYCODONE: NEGATIVE
POC (PCP) PHENCYCLIDINE: NEGATIVE
POC (TCA) TRICYCLIC ANTIDEPRESSANTS: NORMAL
POC TEMPERATURE (URINE): 92
POC THC: NEGATIVE
TRIGL SERPL-MCNC: 72 MG/DL (ref 35–150)
VLDLC SERPL-MCNC: 14 MG/DL

## 2023-02-27 PROCEDURE — 80061 LIPID PANEL: ICD-10-PCS | Mod: ,,, | Performed by: CLINICAL MEDICAL LABORATORY

## 2023-02-27 PROCEDURE — 80061 LIPID PANEL: CPT | Mod: ,,, | Performed by: CLINICAL MEDICAL LABORATORY

## 2023-02-27 PROCEDURE — 80305 DRUG TEST PRSMV DIR OPT OBS: CPT | Mod: QW,,, | Performed by: FAMILY MEDICINE

## 2023-02-27 PROCEDURE — 4010F ACE/ARB THERAPY RXD/TAKEN: CPT | Mod: ,,, | Performed by: FAMILY MEDICINE

## 2023-02-27 PROCEDURE — 82947 ASSAY GLUCOSE BLOOD QUANT: CPT | Mod: ,,, | Performed by: CLINICAL MEDICAL LABORATORY

## 2023-02-27 PROCEDURE — 99396 PREV VISIT EST AGE 40-64: CPT | Mod: ,,, | Performed by: FAMILY MEDICINE

## 2023-02-27 PROCEDURE — 3078F DIAST BP <80 MM HG: CPT | Mod: ,,, | Performed by: FAMILY MEDICINE

## 2023-02-27 PROCEDURE — 4010F PR ACE/ARB THEARPY RXD/TAKEN: ICD-10-PCS | Mod: ,,, | Performed by: FAMILY MEDICINE

## 2023-02-27 PROCEDURE — 99396 PR PREVENTIVE VISIT,EST,40-64: ICD-10-PCS | Mod: ,,, | Performed by: FAMILY MEDICINE

## 2023-02-27 PROCEDURE — 80305 POCT URINE DRUG SCREEN PRESUMP: ICD-10-PCS | Mod: QW,,, | Performed by: FAMILY MEDICINE

## 2023-02-27 PROCEDURE — 82947 GLUCOSE, RANDOM: ICD-10-PCS | Mod: ,,, | Performed by: CLINICAL MEDICAL LABORATORY

## 2023-02-27 PROCEDURE — 3008F BODY MASS INDEX DOCD: CPT | Mod: ,,, | Performed by: FAMILY MEDICINE

## 2023-02-27 PROCEDURE — 3078F PR MOST RECENT DIASTOLIC BLOOD PRESSURE < 80 MM HG: ICD-10-PCS | Mod: ,,, | Performed by: FAMILY MEDICINE

## 2023-02-27 PROCEDURE — 3077F SYST BP >= 140 MM HG: CPT | Mod: ,,, | Performed by: FAMILY MEDICINE

## 2023-02-27 PROCEDURE — 3008F PR BODY MASS INDEX (BMI) DOCUMENTED: ICD-10-PCS | Mod: ,,, | Performed by: FAMILY MEDICINE

## 2023-02-27 PROCEDURE — 3077F PR MOST RECENT SYSTOLIC BLOOD PRESSURE >= 140 MM HG: ICD-10-PCS | Mod: ,,, | Performed by: FAMILY MEDICINE

## 2023-02-27 RX ORDER — DEXTROAMPHETAMINE SACCHARATE, AMPHETAMINE ASPARTATE, DEXTROAMPHETAMINE SULFATE AND AMPHETAMINE SULFATE 7.5; 7.5; 7.5; 7.5 MG/1; MG/1; MG/1; MG/1
1 TABLET ORAL 2 TIMES DAILY
Qty: 60 TABLET | Refills: 0 | Status: SHIPPED | OUTPATIENT
Start: 2023-02-27 | End: 2023-02-27 | Stop reason: SDUPTHER

## 2023-02-27 RX ORDER — ALPRAZOLAM 0.5 MG/1
0.5 TABLET ORAL 3 TIMES DAILY
Qty: 90 TABLET | Refills: 2 | Status: SHIPPED | OUTPATIENT
Start: 2023-02-27 | End: 2023-05-26 | Stop reason: SDUPTHER

## 2023-02-27 RX ORDER — TRAZODONE HYDROCHLORIDE 100 MG/1
100 TABLET ORAL NIGHTLY
Qty: 90 TABLET | Refills: 2 | Status: SHIPPED | OUTPATIENT
Start: 2023-02-27 | End: 2023-08-28 | Stop reason: SDUPTHER

## 2023-02-27 RX ORDER — DEXTROAMPHETAMINE SACCHARATE, AMPHETAMINE ASPARTATE, DEXTROAMPHETAMINE SULFATE AND AMPHETAMINE SULFATE 7.5; 7.5; 7.5; 7.5 MG/1; MG/1; MG/1; MG/1
1 TABLET ORAL 2 TIMES DAILY
Qty: 60 TABLET | Refills: 0 | Status: SHIPPED | OUTPATIENT
Start: 2023-02-27 | End: 2023-05-26 | Stop reason: SDUPTHER

## 2023-02-27 RX ORDER — SILDENAFIL 100 MG/1
100 TABLET, FILM COATED ORAL
Qty: 10 TABLET | Refills: 11 | Status: SHIPPED | OUTPATIENT
Start: 2023-02-27 | End: 2023-02-27

## 2023-02-27 RX ORDER — SILDENAFIL 100 MG/1
100 TABLET, FILM COATED ORAL
Qty: 30 TABLET | Refills: 11 | Status: SHIPPED | OUTPATIENT
Start: 2023-02-27 | End: 2023-05-26 | Stop reason: SDUPTHER

## 2023-02-27 NOTE — PROGRESS NOTES
Subjective     Patient ID: Osmar Brito is a 52 y.o. male.    Chief Complaint: Healthy You (Patient is here for healthy you [id number])    HY    Review of Systems   Constitutional:  Negative for activity change, appetite change, fatigue, fever and unexpected weight change.   HENT:  Negative for nasal congestion, dental problem, ear pain, hearing loss, mouth sores, nosebleeds, sore throat, tinnitus and voice change.    Eyes:  Negative for pain, discharge and visual disturbance.   Respiratory:  Negative for apnea, choking, chest tightness, shortness of breath and wheezing.    Cardiovascular:  Negative for chest pain, palpitations, leg swelling and claudication.   Gastrointestinal:  Negative for abdominal pain, blood in stool, change in bowel habit and change in bowel habit.   Endocrine: Negative for polydipsia, polyphagia and polyuria.   Genitourinary:  Negative for bladder incontinence, dysuria, erectile dysfunction, flank pain, genital sores and hematuria.   Musculoskeletal:  Negative for arthralgias, gait problem, neck pain and neck stiffness.   Integumentary:  Negative for rash, wound, mole/lesion, breast mass and breast discharge.   Allergic/Immunologic: Negative for food allergies.   Neurological:  Negative for seizures, syncope, headaches, coordination difficulties, memory loss and coordination difficulties.   Hematological:  Negative for adenopathy. Does not bruise/bleed easily.   Psychiatric/Behavioral:  Positive for decreased concentration and dysphoric mood. Negative for agitation, behavioral problems, confusion, hallucinations, self-injury, sleep disturbance and suicidal ideas. The patient is nervous/anxious.    Breast: Negative for mass    Tobacco Use: High Risk    Smoking Tobacco Use: Every Day    Smokeless Tobacco Use: Never    Passive Exposure: Not on file     Review of patient's allergies indicates:  No Known Allergies  Current Outpatient Medications   Medication Instructions    ALPRAZolam (XANAX) 0.5 mg,  "Oral, 3 times daily    atorvastatin (LIPITOR) 10 mg, Oral, Daily    calcium carbonate/vitamin D3 (CALCIUM WITH VITAMIN D ORAL) Oral, Take one tablet by mouth daily     celecoxib (CELEBREX) 100 mg, Oral, 2 times daily    dextroamphetamine-amphetamine 30 mg Tab 30 mg, Oral, 2 times daily    hydroCHLOROthiazide (HYDRODIURIL) 12.5 mg, Oral, Daily    losartan (COZAAR) 100 mg, Oral, Daily    metFORMIN (GLUCOPHAGE-XR) 500 mg, Oral, Daily    pregabalin (LYRICA) 100 mg, Oral, 2 times daily    sildenafiL (VIAGRA) 100 mg, Oral, As needed (PRN)    traZODone (DESYREL) 100 mg, Oral, Nightly     Medications Discontinued During This Encounter   Medication Reason    acetaminophen (TYLENOL) 325 MG tablet     naproxen (NAPROSYN) 500 MG tablet     prazosin (MINIPRESS) 1 MG Cap     traZODone (DESYREL) 100 MG tablet Reorder    ALPRAZolam (XANAX) 0.5 MG tablet Reorder    sildenafiL (VIAGRA) 100 MG tablet Reorder    dextroamphetamine-amphetamine 30 mg Tab Reorder    dextroamphetamine-amphetamine 30 mg Tab Reorder    dextroamphetamine-amphetamine 30 mg Tab Reorder    sildenafiL (VIAGRA) 100 MG tablet        Past Medical History:   Diagnosis Date    Hyperlipidemia     Hypertension     Narcolepsy without cataplexy 05/05/2020    Obesity 10/18/2016    Panic disorder (episodic paroxysmal anxiety) 05/05/2020    Post-traumatic stress disorder, acute 05/05/2020    Type II diabetes mellitus 05/05/2020     Health Maintenance Topics with due status: Not Due       Topic Last Completion Date    Low Dose Statin 07/07/2022    Hemoglobin A1c 11/04/2022    Lipid Panel 02/27/2023       There is no immunization history on file for this patient.    Objective     Body mass index is 38.4 kg/m².  Wt Readings from Last 3 Encounters:   02/27/23 121.4 kg (267 lb 9.6 oz)   11/04/22 122.9 kg (271 lb)   08/12/22 129.7 kg (286 lb)     Ht Readings from Last 3 Encounters:   02/27/23 5' 10" (1.778 m)   11/04/22 5' 10" (1.778 m)   08/12/22 5' 10" (1.778 m)     BP Readings " from Last 3 Encounters:   02/27/23 (!) 140/60   11/04/22 136/64   08/12/22 120/72     Temp Readings from Last 3 Encounters:   05/16/22 97.8 °F (36.6 °C) (Oral)   11/15/21 98.1 °F (36.7 °C) (Oral)   07/08/21 98.4 °F (36.9 °C) (Oral)     Pulse Readings from Last 3 Encounters:   02/27/23 95   11/04/22 88   08/12/22 72     Resp Readings from Last 3 Encounters:   02/27/23 18   11/04/22 16   08/12/22 16     PF Readings from Last 3 Encounters:   No data found for PF       Physical Exam  Constitutional:       General: He is not in acute distress.     Appearance: Normal appearance.   HENT:      Head: Normocephalic.      Right Ear: Tympanic membrane and ear canal normal.      Left Ear: Tympanic membrane and ear canal normal.      Nose: Nose normal.      Mouth/Throat:      Mouth: Mucous membranes are moist.      Pharynx: No oropharyngeal exudate.   Eyes:      Extraocular Movements: Extraocular movements intact.      Pupils: Pupils are equal, round, and reactive to light.   Cardiovascular:      Rate and Rhythm: Normal rate and regular rhythm.      Heart sounds: No murmur heard.  Pulmonary:      Effort: Pulmonary effort is normal.      Breath sounds: Normal breath sounds. No wheezing.   Abdominal:      General: Abdomen is flat. Bowel sounds are normal.      Palpations: Abdomen is soft.      Hernia: No hernia is present.   Musculoskeletal:         General: Normal range of motion.      Cervical back: Normal range of motion and neck supple.      Right lower leg: No edema.      Left lower leg: No edema.   Lymphadenopathy:      Cervical: No cervical adenopathy.   Skin:     General: Skin is warm and dry.      Coloration: Skin is not jaundiced.      Findings: No lesion.   Neurological:      General: No focal deficit present.      Mental Status: He is alert and oriented to person, place, and time.      Cranial Nerves: No cranial nerve deficit.      Gait: Gait normal.   Psychiatric:         Mood and Affect: Mood normal.         Behavior:  Behavior normal.         Judgment: Judgment normal.       Assessment and Plan     Problem List Items Addressed This Visit          Psychiatric    Panic disorder (episodic paroxysmal anxiety)    Relevant Medications    ALPRAZolam (XANAX) 0.5 MG tablet     Other Visit Diagnoses       Screening for diabetes mellitus    -  Primary    Relevant Orders    Glucose, Random (Completed)    Erectile dysfunction, unspecified erectile dysfunction type        Relevant Medications    sildenafiL (VIAGRA) 100 MG tablet    Screening for lipoid disorders        Relevant Orders    Lipid Panel (Completed)    Encounter for long-term (current) use of other medications        Relevant Orders    POCT Urine Drug Screen Presump (Completed)    Attention deficit disorder (ADD) without hyperactivity        Relevant Medications    dextroamphetamine-amphetamine 30 mg Tab    Prostate cancer screening        Relevant Orders    PSA, Screening    Routine general medical examination at a health care facility                Plan:       I  reviewed the medications, allergies, and problem list.     Goal Actions:    What type of visit is the patient here for today?: Healthy You  Does the patient consent to enroll in [facility] Healthy?: Yes  Is this a Wellness Follow Up?: No  What is your overall wellness goal? (select at least one): Lifestyle modifications  Choose 3: Biometric, Lifestyle, Nutrition  Biometric Actions: Attend regularly scheduled office visits  Lifestyle Actions : Take medications as prescribed  Nurtrition Actions: Avoid adding table salt

## 2023-03-27 ENCOUNTER — PATIENT OUTREACH (OUTPATIENT)
Dept: ADMINISTRATIVE | Facility: HOSPITAL | Age: 53
End: 2023-03-27

## 2023-03-27 NOTE — PROGRESS NOTES
Post visit Population Health review of encounter with date of service 02/27/2023 with Dr. Breaux.  HY biometrics present in [facility] website.  HY scheduled.

## 2023-05-26 ENCOUNTER — OFFICE VISIT (OUTPATIENT)
Dept: FAMILY MEDICINE | Facility: CLINIC | Age: 53
End: 2023-05-26
Payer: COMMERCIAL

## 2023-05-26 VITALS — DIASTOLIC BLOOD PRESSURE: 78 MMHG | SYSTOLIC BLOOD PRESSURE: 130 MMHG

## 2023-05-26 DIAGNOSIS — F41.0 PANIC DISORDER (EPISODIC PAROXYSMAL ANXIETY): ICD-10-CM

## 2023-05-26 DIAGNOSIS — N52.9 ERECTILE DYSFUNCTION, UNSPECIFIED ERECTILE DYSFUNCTION TYPE: ICD-10-CM

## 2023-05-26 DIAGNOSIS — Z79.899 ENCOUNTER FOR LONG-TERM (CURRENT) USE OF OTHER MEDICATIONS: ICD-10-CM

## 2023-05-26 DIAGNOSIS — I10 HYPERTENSION, UNSPECIFIED TYPE: ICD-10-CM

## 2023-05-26 DIAGNOSIS — F98.8 ATTENTION DEFICIT DISORDER (ADD) WITHOUT HYPERACTIVITY: ICD-10-CM

## 2023-05-26 DIAGNOSIS — E11.9 TYPE 2 DIABETES MELLITUS WITHOUT COMPLICATION, WITHOUT LONG-TERM CURRENT USE OF INSULIN: Primary | ICD-10-CM

## 2023-05-26 LAB
ALBUMIN SERPL BCP-MCNC: 3.6 G/DL (ref 3.5–5)
ALBUMIN/GLOB SERPL: 0.9 {RATIO}
ALP SERPL-CCNC: 92 U/L (ref 45–115)
ALT SERPL W P-5'-P-CCNC: 30 U/L (ref 16–61)
ANION GAP SERPL CALCULATED.3IONS-SCNC: 7 MMOL/L (ref 7–16)
AST SERPL W P-5'-P-CCNC: 15 U/L (ref 15–37)
BASOPHILS # BLD AUTO: 0.05 K/UL (ref 0–0.2)
BASOPHILS NFR BLD AUTO: 0.7 % (ref 0–1)
BILIRUB SERPL-MCNC: 0.5 MG/DL (ref ?–1.2)
BUN SERPL-MCNC: 21 MG/DL (ref 7–18)
BUN/CREAT SERPL: 18 (ref 6–20)
CALCIUM SERPL-MCNC: 9.2 MG/DL (ref 8.5–10.1)
CHLORIDE SERPL-SCNC: 104 MMOL/L (ref 98–107)
CHOLEST SERPL-MCNC: 166 MG/DL (ref 0–200)
CHOLEST/HDLC SERPL: 2 {RATIO}
CO2 SERPL-SCNC: 27 MMOL/L (ref 21–32)
CREAT SERPL-MCNC: 1.2 MG/DL (ref 0.7–1.3)
CTP QC/QA: YES
DIFFERENTIAL METHOD BLD: ABNORMAL
EGFR (NO RACE VARIABLE) (RUSH/TITUS): 74 ML/MIN/1.73M2
EOSINOPHIL # BLD AUTO: 0.16 K/UL (ref 0–0.5)
EOSINOPHIL NFR BLD AUTO: 2.4 % (ref 1–4)
ERYTHROCYTE [DISTWIDTH] IN BLOOD BY AUTOMATED COUNT: 14.3 % (ref 11.5–14.5)
EST. AVERAGE GLUCOSE BLD GHB EST-MCNC: 124 MG/DL
GLOBULIN SER-MCNC: 3.8 G/DL (ref 2–4)
GLUCOSE SERPL-MCNC: 89 MG/DL (ref 74–106)
HBA1C MFR BLD HPLC: 6.3 % (ref 4.5–6.6)
HCT VFR BLD AUTO: 45.1 % (ref 40–54)
HDLC SERPL-MCNC: 82 MG/DL (ref 40–60)
HGB BLD-MCNC: 14.4 G/DL (ref 13.5–18)
IMM GRANULOCYTES # BLD AUTO: 0.02 K/UL (ref 0–0.04)
IMM GRANULOCYTES NFR BLD: 0.3 % (ref 0–0.4)
LDLC SERPL CALC-MCNC: 74 MG/DL
LYMPHOCYTES # BLD AUTO: 1.68 K/UL (ref 1–4.8)
LYMPHOCYTES NFR BLD AUTO: 24.9 % (ref 27–41)
MCH RBC QN AUTO: 27.2 PG (ref 27–31)
MCHC RBC AUTO-ENTMCNC: 31.9 G/DL (ref 32–36)
MCV RBC AUTO: 85.3 FL (ref 80–96)
MONOCYTES # BLD AUTO: 0.43 K/UL (ref 0–0.8)
MONOCYTES NFR BLD AUTO: 6.4 % (ref 2–6)
MPC BLD CALC-MCNC: 10.6 FL (ref 9.4–12.4)
NEUTROPHILS # BLD AUTO: 4.42 K/UL (ref 1.8–7.7)
NEUTROPHILS NFR BLD AUTO: 65.3 % (ref 53–65)
NONHDLC SERPL-MCNC: 84 MG/DL
NRBC # BLD AUTO: 0 X10E3/UL
NRBC, AUTO (.00): 0 %
PLATELET # BLD AUTO: 235 K/UL (ref 150–400)
POC (AMP) AMPHETAMINE: ABNORMAL
POC (BAR) BARBITURATES: NEGATIVE
POC (BUP) BUPRENORPHINE: NEGATIVE
POC (BZO) BENZODIAZEPINES: ABNORMAL
POC (COC) COCAINE: NEGATIVE
POC (MDMA) METHYLENEDIOXYMETHAMPHETAMINE 3,4: NEGATIVE
POC (MET) METHAMPHETAMINE: NEGATIVE
POC (MOP) OPIATES: NEGATIVE
POC (MTD) METHADONE: NEGATIVE
POC (OXY) OXYCODONE: NEGATIVE
POC (PCP) PHENCYCLIDINE: NEGATIVE
POC (TCA) TRICYCLIC ANTIDEPRESSANTS: ABNORMAL
POC TEMPERATURE (URINE): 92
POC THC: NEGATIVE
POTASSIUM SERPL-SCNC: 4 MMOL/L (ref 3.5–5.1)
PROT SERPL-MCNC: 7.4 G/DL (ref 6.4–8.2)
RBC # BLD AUTO: 5.29 M/UL (ref 4.6–6.2)
SODIUM SERPL-SCNC: 134 MMOL/L (ref 136–145)
TRIGL SERPL-MCNC: 48 MG/DL (ref 35–150)
VLDLC SERPL-MCNC: 10 MG/DL
WBC # BLD AUTO: 6.76 K/UL (ref 4.5–11)

## 2023-05-26 PROCEDURE — 4010F ACE/ARB THERAPY RXD/TAKEN: CPT | Mod: ,,, | Performed by: FAMILY MEDICINE

## 2023-05-26 PROCEDURE — 3075F PR MOST RECENT SYSTOLIC BLOOD PRESS GE 130-139MM HG: ICD-10-PCS | Mod: ,,, | Performed by: FAMILY MEDICINE

## 2023-05-26 PROCEDURE — 1160F PR REVIEW ALL MEDS BY PRESCRIBER/CLIN PHARMACIST DOCUMENTED: ICD-10-PCS | Mod: ,,, | Performed by: FAMILY MEDICINE

## 2023-05-26 PROCEDURE — 3078F DIAST BP <80 MM HG: CPT | Mod: ,,, | Performed by: FAMILY MEDICINE

## 2023-05-26 PROCEDURE — 99214 OFFICE O/P EST MOD 30 MIN: CPT | Mod: ,,, | Performed by: FAMILY MEDICINE

## 2023-05-26 PROCEDURE — 80305 DRUG TEST PRSMV DIR OPT OBS: CPT | Mod: QW,,, | Performed by: FAMILY MEDICINE

## 2023-05-26 PROCEDURE — 3044F PR MOST RECENT HEMOGLOBIN A1C LEVEL <7.0%: ICD-10-PCS | Mod: ,,, | Performed by: FAMILY MEDICINE

## 2023-05-26 PROCEDURE — 3075F SYST BP GE 130 - 139MM HG: CPT | Mod: ,,, | Performed by: FAMILY MEDICINE

## 2023-05-26 PROCEDURE — 85025 COMPLETE CBC W/AUTO DIFF WBC: CPT | Mod: ,,, | Performed by: CLINICAL MEDICAL LABORATORY

## 2023-05-26 PROCEDURE — 1160F RVW MEDS BY RX/DR IN RCRD: CPT | Mod: ,,, | Performed by: FAMILY MEDICINE

## 2023-05-26 PROCEDURE — 80053 COMPREHENSIVE METABOLIC PANEL: ICD-10-PCS | Mod: ,,, | Performed by: CLINICAL MEDICAL LABORATORY

## 2023-05-26 PROCEDURE — 80061 LIPID PANEL: CPT | Mod: ,,, | Performed by: CLINICAL MEDICAL LABORATORY

## 2023-05-26 PROCEDURE — 80305 POCT URINE DRUG SCREEN PRESUMP: ICD-10-PCS | Mod: QW,,, | Performed by: FAMILY MEDICINE

## 2023-05-26 PROCEDURE — 1159F PR MEDICATION LIST DOCUMENTED IN MEDICAL RECORD: ICD-10-PCS | Mod: ,,, | Performed by: FAMILY MEDICINE

## 2023-05-26 PROCEDURE — 1159F MED LIST DOCD IN RCRD: CPT | Mod: ,,, | Performed by: FAMILY MEDICINE

## 2023-05-26 PROCEDURE — 3044F HG A1C LEVEL LT 7.0%: CPT | Mod: ,,, | Performed by: FAMILY MEDICINE

## 2023-05-26 PROCEDURE — 83036 HEMOGLOBIN GLYCOSYLATED A1C: CPT | Mod: ,,, | Performed by: CLINICAL MEDICAL LABORATORY

## 2023-05-26 PROCEDURE — 85025 CBC WITH DIFFERENTIAL: ICD-10-PCS | Mod: ,,, | Performed by: CLINICAL MEDICAL LABORATORY

## 2023-05-26 PROCEDURE — 3078F PR MOST RECENT DIASTOLIC BLOOD PRESSURE < 80 MM HG: ICD-10-PCS | Mod: ,,, | Performed by: FAMILY MEDICINE

## 2023-05-26 PROCEDURE — 80053 COMPREHEN METABOLIC PANEL: CPT | Mod: ,,, | Performed by: CLINICAL MEDICAL LABORATORY

## 2023-05-26 PROCEDURE — 80061 LIPID PANEL: ICD-10-PCS | Mod: ,,, | Performed by: CLINICAL MEDICAL LABORATORY

## 2023-05-26 PROCEDURE — 4010F PR ACE/ARB THEARPY RXD/TAKEN: ICD-10-PCS | Mod: ,,, | Performed by: FAMILY MEDICINE

## 2023-05-26 PROCEDURE — 99214 PR OFFICE/OUTPT VISIT, EST, LEVL IV, 30-39 MIN: ICD-10-PCS | Mod: ,,, | Performed by: FAMILY MEDICINE

## 2023-05-26 PROCEDURE — 83036 HEMOGLOBIN A1C: ICD-10-PCS | Mod: ,,, | Performed by: CLINICAL MEDICAL LABORATORY

## 2023-05-26 RX ORDER — DEXTROAMPHETAMINE SACCHARATE, AMPHETAMINE ASPARTATE, DEXTROAMPHETAMINE SULFATE AND AMPHETAMINE SULFATE 7.5; 7.5; 7.5; 7.5 MG/1; MG/1; MG/1; MG/1
1 TABLET ORAL 2 TIMES DAILY
Qty: 60 TABLET | Refills: 0 | Status: SHIPPED | OUTPATIENT
Start: 2023-05-26 | End: 2023-05-26 | Stop reason: SDUPTHER

## 2023-05-26 RX ORDER — DEXTROAMPHETAMINE SACCHARATE, AMPHETAMINE ASPARTATE, DEXTROAMPHETAMINE SULFATE AND AMPHETAMINE SULFATE 7.5; 7.5; 7.5; 7.5 MG/1; MG/1; MG/1; MG/1
1 TABLET ORAL 2 TIMES DAILY
Qty: 60 TABLET | Refills: 0 | Status: SHIPPED | OUTPATIENT
Start: 2023-05-26 | End: 2023-08-28 | Stop reason: SDUPTHER

## 2023-05-26 RX ORDER — ALPRAZOLAM 0.5 MG/1
0.5 TABLET ORAL 3 TIMES DAILY
Qty: 90 TABLET | Refills: 2 | Status: SHIPPED | OUTPATIENT
Start: 2023-05-26 | End: 2023-08-28 | Stop reason: SDUPTHER

## 2023-05-26 RX ORDER — PREGABALIN 100 MG/1
100 CAPSULE ORAL 2 TIMES DAILY
Qty: 90 CAPSULE | Refills: 2 | Status: SHIPPED | OUTPATIENT
Start: 2023-05-26 | End: 2023-08-28 | Stop reason: SDUPTHER

## 2023-05-26 RX ORDER — SILDENAFIL 100 MG/1
100 TABLET, FILM COATED ORAL
Qty: 30 TABLET | Refills: 11 | Status: SHIPPED | OUTPATIENT
Start: 2023-05-26 | End: 2023-08-28 | Stop reason: SDUPTHER

## 2023-05-26 NOTE — PROGRESS NOTES
Josesito Breaux MD        PATIENT NAME: Osmar Brito  : 1970  DATE: 23  MRN: [id number]      Billing Provider: Josesito Breaux MD  Level of Service: [place] OFFICE/OUTPT VISIT, EST, LEVL IV, 30-39 MIN  Patient PCP Information       Provider PCP Type    Luis Mosley MD General            Reason for Visit / Chief Complaint: No chief complaint on file.       Update PCP  Update Chief Complaint         History of Present Illness / Problem Focused Workflow     Osmra Brito presents to the clinic with No chief complaint on file.     Routine followup.  No significant interval change      Review of Systems     Review of Systems   Constitutional:  Negative for activity change, appetite change, fever and unexpected weight change.   HENT:  Negative for congestion, hearing loss, rhinorrhea, sinus pressure, sinus pain, sore throat and trouble swallowing.    Eyes:  Negative for photophobia, pain, discharge and visual disturbance.   Respiratory:  Negative for cough, chest tightness, wheezing and stridor.    Cardiovascular:  Negative for chest pain, palpitations and leg swelling.   Gastrointestinal:  Negative for abdominal pain, blood in stool, constipation, diarrhea, nausea and vomiting.   Endocrine: Negative for polydipsia, polyphagia and polyuria.   Genitourinary:  Negative for difficulty urinating, flank pain, hematuria and urgency.   Musculoskeletal:  Positive for arthralgias. Negative for joint swelling and neck pain.   Skin:  Negative for rash.   Allergic/Immunologic: Negative for food allergies.   Neurological:  Negative for dizziness, tremors, seizures, syncope, weakness and headaches.   Psychiatric/Behavioral:  Negative for behavioral problems, confusion, decreased concentration, dysphoric mood and hallucinations. The patient is not nervous/anxious.       Medical / Social / Family History     Past Medical History:   Diagnosis Date    Hyperlipidemia     Hypertension     Narcolepsy without cataplexy 2020     Obesity 10/18/2016    Panic disorder (episodic paroxysmal anxiety) 05/05/2020    Post-traumatic stress disorder, acute 05/05/2020    Type II diabetes mellitus 05/05/2020       History reviewed. No pertinent surgical history.    Social History    reports that he has been smoking vaping with nicotine. He has never used smokeless tobacco. He reports that he does not drink alcohol and does not use drugs.    Family History  's family history includes Diabetes in his maternal aunt and maternal uncle; Stroke in his mother.    Medications and Allergies     Medications  No outpatient medications have been marked as taking for the 5/26/23 encounter (Office Visit) with Josesito Breaux MD.       Allergies  Review of patient's allergies indicates:  No Known Allergies    Physical Examination     Vitals:    05/26/23 0825   BP: 130/78     Physical Exam  Constitutional:       General: He is not in acute distress.     Appearance: Normal appearance.   HENT:      Head: Normocephalic.      Right Ear: Tympanic membrane and ear canal normal.      Left Ear: Tympanic membrane and ear canal normal.      Nose: Nose normal.      Mouth/Throat:      Mouth: Mucous membranes are moist.      Pharynx: No oropharyngeal exudate.   Eyes:      Extraocular Movements: Extraocular movements intact.      Pupils: Pupils are equal, round, and reactive to light.   Cardiovascular:      Rate and Rhythm: Normal rate and regular rhythm.      Heart sounds: No murmur heard.  Pulmonary:      Effort: Pulmonary effort is normal.      Breath sounds: Normal breath sounds. No wheezing.   Abdominal:      General: Abdomen is flat. Bowel sounds are normal.      Palpations: Abdomen is soft.      Hernia: No hernia is present.   Musculoskeletal:         General: Normal range of motion.      Cervical back: Normal range of motion and neck supple.      Right lower leg: No edema.      Left lower leg: No edema.   Lymphadenopathy:      Cervical: No cervical adenopathy.    Skin:     General: Skin is warm and dry.      Coloration: Skin is not jaundiced.      Findings: No lesion.   Neurological:      General: No focal deficit present.      Mental Status: He is alert and oriented to person, place, and time.      Cranial Nerves: No cranial nerve deficit.      Gait: Gait normal.   Psychiatric:         Mood and Affect: Mood normal.         Behavior: Behavior normal.         Judgment: Judgment normal.        Assessment and Plan (including Health Maintenance)      Problem List  Smart Activity Rocket  Document Outside HM   :    Plan:     1. Encounter for long-term (current) use of other medications  The current medical regimen is effective;  continue present plan and medications.  -     POCT Urine Drug Screen Presump    2. Hypertension, unspecified type  The current medical regimen is effective;  continue present plan and medications.  -     CBC Auto Differential  -     Comprehensive Metabolic Panel  -     Lipid Panel    3. Type 2 diabetes mellitus without complication, without long-term current use of insulin  The current medical regimen is effective;  continue present plan and medications.  -     Hemoglobin A1C    4. Erectile dysfunction, unspecified erectile dysfunction type  The current medical regimen is effective;  continue present plan and medications.    5. Panic disorder (episodic paroxysmal anxiety)  The current medical regimen is effective;  continue present plan and medications.    6. Attention deficit disorder (ADD) without hyperactivity  The current medical regimen is effective;  continue present plan and medications.          Health Maintenance Due   Topic Date Due    Hepatitis C Screening  Never done    Pneumococcal Vaccines (Age 0-64) (1 - PCV) Never done    TETANUS VACCINE  Never done    Shingles Vaccine (1 of 2) Never done       Problem List Items Addressed This Visit          Psychiatric    Panic disorder (episodic paroxysmal anxiety)    Relevant Medications    ALPRAZolam (XANAX)  0.5 MG tablet       Cardiac/Vascular    Hypertension    Relevant Orders    CBC Auto Differential (Completed)    Comprehensive Metabolic Panel (Completed)    Lipid Panel (Completed)       Endocrine    Type II diabetes mellitus - Primary    Relevant Orders    Hemoglobin A1C (Completed)     Other Visit Diagnoses       Encounter for long-term (current) use of other medications        Relevant Orders    POCT Urine Drug Screen Presump (Completed)    Erectile dysfunction, unspecified erectile dysfunction type        Relevant Medications    sildenafiL (VIAGRA) 100 MG tablet    Attention deficit disorder (ADD) without hyperactivity        Relevant Medications    dextroamphetamine-amphetamine 30 mg Tab            Health Maintenance Topics with due status: Not Due       Topic Last Completion Date    Low Dose Statin 07/07/2022    Foot Exam 05/26/2023    Lipid Panel 05/26/2023    Hemoglobin A1c 05/26/2023       Future Appointments   Date Time Provider Department Center   8/28/2023  7:[facility]   2/27/2024  8:30 AM Josesito Breaux MD [facility]        There are no Patient Instructions on file for this visit.  Follow up in about 3 months (around 8/26/2023) for routine followup.     Signature:  Josesito Breaux MD      Date of encounter: 5/26/23

## 2023-08-28 ENCOUNTER — OFFICE VISIT (OUTPATIENT)
Dept: FAMILY MEDICINE | Facility: CLINIC | Age: 53
End: 2023-08-28
Payer: COMMERCIAL

## 2023-08-28 VITALS
HEART RATE: 102 BPM | DIASTOLIC BLOOD PRESSURE: 83 MMHG | BODY MASS INDEX: 38.94 KG/M2 | HEIGHT: 70 IN | SYSTOLIC BLOOD PRESSURE: 161 MMHG | OXYGEN SATURATION: 100 % | RESPIRATION RATE: 20 BRPM | WEIGHT: 272 LBS

## 2023-08-28 DIAGNOSIS — N52.9 ERECTILE DYSFUNCTION, UNSPECIFIED ERECTILE DYSFUNCTION TYPE: ICD-10-CM

## 2023-08-28 DIAGNOSIS — F41.0 PANIC DISORDER (EPISODIC PAROXYSMAL ANXIETY): Primary | ICD-10-CM

## 2023-08-28 DIAGNOSIS — E11.9 TYPE 2 DIABETES MELLITUS WITHOUT COMPLICATION, WITHOUT LONG-TERM CURRENT USE OF INSULIN: ICD-10-CM

## 2023-08-28 DIAGNOSIS — I10 PRIMARY HYPERTENSION: ICD-10-CM

## 2023-08-28 DIAGNOSIS — F98.8 ATTENTION DEFICIT DISORDER (ADD) WITHOUT HYPERACTIVITY: ICD-10-CM

## 2023-08-28 DIAGNOSIS — Z79.899 ENCOUNTER FOR LONG-TERM (CURRENT) USE OF OTHER MEDICATIONS: ICD-10-CM

## 2023-08-28 LAB
CTP QC/QA: YES
POC (AMP) AMPHETAMINE: ABNORMAL
POC (BAR) BARBITURATES: NEGATIVE
POC (BUP) BUPRENORPHINE: NEGATIVE
POC (BZO) BENZODIAZEPINES: NEGATIVE
POC (COC) COCAINE: NEGATIVE
POC (MDMA) METHYLENEDIOXYMETHAMPHETAMINE 3,4: NEGATIVE
POC (MET) METHAMPHETAMINE: NEGATIVE
POC (MOP) OPIATES: NEGATIVE
POC (MTD) METHADONE: NEGATIVE
POC (OXY) OXYCODONE: NEGATIVE
POC (PCP) PHENCYCLIDINE: NEGATIVE
POC (TCA) TRICYCLIC ANTIDEPRESSANTS: ABNORMAL
POC TEMPERATURE (URINE): 92
POC THC: NEGATIVE

## 2023-08-28 PROCEDURE — 3079F PR MOST RECENT DIASTOLIC BLOOD PRESSURE 80-89 MM HG: ICD-10-PCS | Mod: ,,, | Performed by: FAMILY MEDICINE

## 2023-08-28 PROCEDURE — 1159F MED LIST DOCD IN RCRD: CPT | Mod: ,,, | Performed by: FAMILY MEDICINE

## 2023-08-28 PROCEDURE — 3077F PR MOST RECENT SYSTOLIC BLOOD PRESSURE >= 140 MM HG: ICD-10-PCS | Mod: ,,, | Performed by: FAMILY MEDICINE

## 2023-08-28 PROCEDURE — 4010F ACE/ARB THERAPY RXD/TAKEN: CPT | Mod: ,,, | Performed by: FAMILY MEDICINE

## 2023-08-28 PROCEDURE — 3044F HG A1C LEVEL LT 7.0%: CPT | Mod: ,,, | Performed by: FAMILY MEDICINE

## 2023-08-28 PROCEDURE — 1160F RVW MEDS BY RX/DR IN RCRD: CPT | Mod: ,,, | Performed by: FAMILY MEDICINE

## 2023-08-28 PROCEDURE — 3008F BODY MASS INDEX DOCD: CPT | Mod: ,,, | Performed by: FAMILY MEDICINE

## 2023-08-28 PROCEDURE — 3008F PR BODY MASS INDEX (BMI) DOCUMENTED: ICD-10-PCS | Mod: ,,, | Performed by: FAMILY MEDICINE

## 2023-08-28 PROCEDURE — 1159F PR MEDICATION LIST DOCUMENTED IN MEDICAL RECORD: ICD-10-PCS | Mod: ,,, | Performed by: FAMILY MEDICINE

## 2023-08-28 PROCEDURE — 99214 PR OFFICE/OUTPT VISIT, EST, LEVL IV, 30-39 MIN: ICD-10-PCS | Mod: ,,, | Performed by: FAMILY MEDICINE

## 2023-08-28 PROCEDURE — 80305 DRUG TEST PRSMV DIR OPT OBS: CPT | Mod: QW,,, | Performed by: FAMILY MEDICINE

## 2023-08-28 PROCEDURE — 4010F PR ACE/ARB THEARPY RXD/TAKEN: ICD-10-PCS | Mod: ,,, | Performed by: FAMILY MEDICINE

## 2023-08-28 PROCEDURE — 3079F DIAST BP 80-89 MM HG: CPT | Mod: ,,, | Performed by: FAMILY MEDICINE

## 2023-08-28 PROCEDURE — 1160F PR REVIEW ALL MEDS BY PRESCRIBER/CLIN PHARMACIST DOCUMENTED: ICD-10-PCS | Mod: ,,, | Performed by: FAMILY MEDICINE

## 2023-08-28 PROCEDURE — 3044F PR MOST RECENT HEMOGLOBIN A1C LEVEL <7.0%: ICD-10-PCS | Mod: ,,, | Performed by: FAMILY MEDICINE

## 2023-08-28 PROCEDURE — 80305 POCT URINE DRUG SCREEN PRESUMP: ICD-10-PCS | Mod: QW,,, | Performed by: FAMILY MEDICINE

## 2023-08-28 PROCEDURE — 3077F SYST BP >= 140 MM HG: CPT | Mod: ,,, | Performed by: FAMILY MEDICINE

## 2023-08-28 PROCEDURE — 99214 OFFICE O/P EST MOD 30 MIN: CPT | Mod: ,,, | Performed by: FAMILY MEDICINE

## 2023-08-28 RX ORDER — TRAZODONE HYDROCHLORIDE 100 MG/1
100 TABLET ORAL NIGHTLY
Qty: 90 TABLET | Refills: 2 | Status: SHIPPED | OUTPATIENT
Start: 2023-08-28

## 2023-08-28 RX ORDER — AMOXICILLIN AND CLAVULANATE POTASSIUM 875; 125 MG/1; MG/1
1 TABLET, FILM COATED ORAL EVERY 12 HOURS
Qty: 20 TABLET | Refills: 0 | Status: SHIPPED | OUTPATIENT
Start: 2023-08-28 | End: 2023-12-01

## 2023-08-28 RX ORDER — PREGABALIN 100 MG/1
100 CAPSULE ORAL 2 TIMES DAILY
Qty: 90 CAPSULE | Refills: 2 | Status: SHIPPED | OUTPATIENT
Start: 2023-08-28

## 2023-08-28 RX ORDER — DEXTROAMPHETAMINE SACCHARATE, AMPHETAMINE ASPARTATE, DEXTROAMPHETAMINE SULFATE AND AMPHETAMINE SULFATE 7.5; 7.5; 7.5; 7.5 MG/1; MG/1; MG/1; MG/1
1 TABLET ORAL 2 TIMES DAILY
Qty: 60 TABLET | Refills: 0 | Status: SHIPPED | OUTPATIENT
Start: 2023-08-28 | End: 2023-08-28 | Stop reason: SDUPTHER

## 2023-08-28 RX ORDER — DEXTROAMPHETAMINE SACCHARATE, AMPHETAMINE ASPARTATE, DEXTROAMPHETAMINE SULFATE AND AMPHETAMINE SULFATE 7.5; 7.5; 7.5; 7.5 MG/1; MG/1; MG/1; MG/1
1 TABLET ORAL 2 TIMES DAILY
Qty: 60 TABLET | Refills: 0 | Status: SHIPPED | OUTPATIENT
Start: 2023-08-28 | End: 2023-12-01 | Stop reason: SDUPTHER

## 2023-08-28 RX ORDER — SILDENAFIL 100 MG/1
100 TABLET, FILM COATED ORAL
Qty: 30 TABLET | Refills: 11 | Status: SHIPPED | OUTPATIENT
Start: 2023-08-28 | End: 2024-03-06 | Stop reason: SDUPTHER

## 2023-08-28 RX ORDER — ALPRAZOLAM 0.5 MG/1
0.5 TABLET ORAL 3 TIMES DAILY
Qty: 90 TABLET | Refills: 2 | Status: SHIPPED | OUTPATIENT
Start: 2023-08-28 | End: 2023-12-01 | Stop reason: SDUPTHER

## 2023-08-28 NOTE — PROGRESS NOTES
Josesito Breaux MD        PATIENT NAME: Osmar Brito  : 1970  DATE: 23  MRN: [id number]      Billing Provider: Josesito Breaux MD  Level of Service: [place] OFFICE/OUTPT VISIT, EST, LEVL IV, 30-39 MIN  Patient PCP Information       Provider PCP Type    Luis Mosley MD General            Reason for Visit / Chief Complaint: Anxiety (Check up for refill), ADHD, and Dental Pain (Patient has a toothache and would like an antibiotic )       Update PCP  Update Chief Complaint         History of Present Illness / Problem Focused Workflow     Osmar Brito presents to the clinic with Anxiety (Check up for refill), ADHD, and Dental Pain (Patient has a toothache and would like an antibiotic )     No work since  due to worsenng PTSD symptoms.  Now bettter.  Doesn't know if will return to work.      Anxiety  Symptoms include decreased concentration and nervous/anxious behavior. Patient reports no chest pain, confusion, dizziness, nausea or palpitations.       Dental Pain   Pertinent negatives include no fever or sinus pressure.     Review of Systems     Review of Systems   Constitutional:  Negative for activity change, appetite change, fever and unexpected weight change.   HENT:  Negative for congestion, rhinorrhea, sinus pressure, sinus pain, sore throat and trouble swallowing.    Eyes:  Negative for photophobia, pain, discharge and visual disturbance.   Respiratory:  Negative for cough, chest tightness, wheezing and stridor.    Cardiovascular:  Negative for chest pain, palpitations and leg swelling.   Gastrointestinal:  Negative for abdominal pain, blood in stool, constipation, diarrhea and nausea.   Endocrine: Negative for polydipsia, polyphagia and polyuria.   Genitourinary:  Negative for difficulty urinating, flank pain and hematuria.   Musculoskeletal:  Negative for arthralgias and neck pain.   Skin:  Negative for rash.   Allergic/Immunologic: Negative for food allergies.   Neurological:  Negative for  dizziness, tremors, seizures, syncope, weakness (global weakness) and headaches.   Psychiatric/Behavioral:  Positive for decreased concentration, dysphoric mood and sleep disturbance. Negative for behavioral problems, confusion and hallucinations. The patient is nervous/anxious.         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 No pertinent surgical history.    Social History    reports that he has been smoking vaping with nicotine. He has been exposed to tobacco smoke. He has never used smokeless tobacco. He reports that he does not drink alcohol and does not use drugs.    Family History  's family history includes Diabetes in his maternal aunt and maternal uncle; Stroke in his mother.    Medications and Allergies     Medications  No outpatient medications have been marked as taking for the 8/28/23 encounter (Office Visit) with Josesito Breaux MD.       Allergies  Review of patient's allergies indicates:  No Known Allergies    Physical Examination     Vitals:    08/28/23 0851   BP: (!) 161/83   Pulse: 102   Resp: 20     Physical Exam  Constitutional:       General: He is not in acute distress.     Appearance: Normal appearance.   HENT:      Head: Normocephalic.      Right Ear: Tympanic membrane and ear canal normal.      Left Ear: Tympanic membrane and ear canal normal.      Nose: Nose normal.      Mouth/Throat:      Mouth: Mucous membranes are moist.      Pharynx: No oropharyngeal exudate.   Eyes:      Extraocular Movements: Extraocular movements intact.      Pupils: Pupils are equal, round, and reactive to light.   Cardiovascular:      Rate and Rhythm: Normal rate and regular rhythm.      Heart  sounds: No murmur heard.  Pulmonary:      Effort: Pulmonary effort is normal.      Breath sounds: Normal breath sounds. No wheezing.   Abdominal:      General: Abdomen is flat. Bowel sounds are normal.      Palpations: Abdomen is soft.      Hernia: No hernia is present.   Musculoskeletal:         General: Normal range of motion.      Cervical back: Normal range of motion and neck supple.      Right lower leg: No edema.      Left lower leg: No edema.   Lymphadenopathy:      Cervical: No cervical adenopathy.   Skin:     General: Skin is warm and dry.      Coloration: Skin is not jaundiced.      Findings: No lesion.   Neurological:      General: No focal deficit present.      Mental Status: He is alert and oriented to person, place, and time.      Cranial Nerves: No cranial nerve deficit.      Gait: Gait normal.   Psychiatric:         Mood and Affect: Mood normal.         Behavior: Behavior normal.         Judgment: Judgment normal.          Assessment and Plan (including Health Maintenance)      Problem List  Smart Sets  Document Outside HM   :    Plan:     1. Encounter for long-term (current) use of other medications  The current medical regimen is effective;  continue present plan and medications.  -     POCT Urine Drug Screen Presump    2. Panic disorder (episodic paroxysmal anxiety)  The current medical regimen is effective;  continue present plan and medications.    3. Attention deficit disorder (ADD) without hyperactivity  The current medical regimen is effective;  continue present plan and medications.    4. Erectile dysfunction, unspecified erectile dysfunction type  The current medical regimen is effective;  continue present plan and medications.          Health Maintenance Due   Topic Date Due    Hepatitis C Screening  Never done    Diabetes Urine Screening  Never done    Pneumococcal Vaccines (Age 0-64) (1 - PCV) Never done    Eye Exam  Never done    HIV Screening  Never done    TETANUS VACCINE  Never done     Low Dose Statin  05/11/2022    Shingles Vaccine (1 of 2) Never done    Colorectal Cancer Screening  Never done       Problem List Items Addressed This Visit          Psychiatric    Panic disorder (episodic paroxysmal anxiety) - Primary    Relevant Medications    ALPRAZolam (XANAX) 0.5 MG tablet       Cardiac/Vascular    Hypertension       Endocrine    Type II diabetes mellitus     Other Visit Diagnoses       Encounter for long-term (current) use of other medications        Relevant Orders    POCT Urine Drug Screen Presump (Completed)    Attention deficit disorder (ADD) without hyperactivity        Relevant Medications    dextroamphetamine-amphetamine 30 mg Tab    Erectile dysfunction, unspecified erectile dysfunction type        Relevant Medications    sildenafiL (VIAGRA) 100 MG tablet            Health Maintenance Topics with due status: Not Due       Topic Last Completion Date    Foot Exam 05/26/2023    Lipid Panel 05/26/2023    Hemoglobin A1c 05/26/2023       Future Appointments   Date Time Provider Department Center   11/28/2023  7:50 AM [facility]   2/27/2024  8:30 AM Josesito Breaux MD [facility]        There are no Patient Instructions on file for this visit.  Follow up in about 3 months (around 11/28/2023) for routine followup.     Signature:  Josesito Breaux MD      Date of encounter: 8/28/23

## 2023-12-01 ENCOUNTER — OFFICE VISIT (OUTPATIENT)
Dept: FAMILY MEDICINE | Facility: CLINIC | Age: 53
End: 2023-12-01
Payer: COMMERCIAL

## 2023-12-01 VITALS
HEIGHT: 70 IN | TEMPERATURE: 99 F | BODY MASS INDEX: 40.94 KG/M2 | HEART RATE: 102 BPM | SYSTOLIC BLOOD PRESSURE: 130 MMHG | WEIGHT: 286 LBS | DIASTOLIC BLOOD PRESSURE: 62 MMHG | RESPIRATION RATE: 16 BRPM | OXYGEN SATURATION: 98 %

## 2023-12-01 DIAGNOSIS — E11.9 TYPE 2 DIABETES MELLITUS WITHOUT COMPLICATION, WITHOUT LONG-TERM CURRENT USE OF INSULIN: ICD-10-CM

## 2023-12-01 DIAGNOSIS — F41.0 PANIC DISORDER (EPISODIC PAROXYSMAL ANXIETY): ICD-10-CM

## 2023-12-01 DIAGNOSIS — Z79.899 ENCOUNTER FOR LONG-TERM (CURRENT) USE OF OTHER MEDICATIONS: ICD-10-CM

## 2023-12-01 DIAGNOSIS — F98.8 ATTENTION DEFICIT DISORDER (ADD) WITHOUT HYPERACTIVITY: Primary | ICD-10-CM

## 2023-12-01 DIAGNOSIS — Z12.5 PROSTATE CANCER SCREENING: ICD-10-CM

## 2023-12-01 DIAGNOSIS — I10 HYPERTENSION, UNSPECIFIED TYPE: ICD-10-CM

## 2023-12-01 LAB
ALBUMIN SERPL BCP-MCNC: 3.7 G/DL (ref 3.5–5)
ALBUMIN/GLOB SERPL: 0.9 {RATIO}
ALP SERPL-CCNC: 81 U/L (ref 45–115)
ALT SERPL W P-5'-P-CCNC: 45 U/L (ref 16–61)
ANION GAP SERPL CALCULATED.3IONS-SCNC: 9 MMOL/L (ref 7–16)
AST SERPL W P-5'-P-CCNC: 20 U/L (ref 15–37)
BASOPHILS # BLD AUTO: 0.05 K/UL (ref 0–0.2)
BASOPHILS NFR BLD AUTO: 0.7 % (ref 0–1)
BILIRUB SERPL-MCNC: 0.6 MG/DL (ref ?–1.2)
BUN SERPL-MCNC: 21 MG/DL (ref 7–18)
BUN/CREAT SERPL: 16 (ref 6–20)
CALCIUM SERPL-MCNC: 9.1 MG/DL (ref 8.5–10.1)
CHLORIDE SERPL-SCNC: 107 MMOL/L (ref 98–107)
CHOLEST SERPL-MCNC: 168 MG/DL (ref 0–200)
CHOLEST/HDLC SERPL: 2.4 {RATIO}
CO2 SERPL-SCNC: 29 MMOL/L (ref 21–32)
CREAT SERPL-MCNC: 1.28 MG/DL (ref 0.7–1.3)
CTP QC/QA: YES
DIFFERENTIAL METHOD BLD: ABNORMAL
EGFR (NO RACE VARIABLE) (RUSH/TITUS): 68 ML/MIN/1.73M2
EOSINOPHIL # BLD AUTO: 0.08 K/UL (ref 0–0.5)
EOSINOPHIL NFR BLD AUTO: 1.1 % (ref 1–4)
ERYTHROCYTE [DISTWIDTH] IN BLOOD BY AUTOMATED COUNT: 15.1 % (ref 11.5–14.5)
EST. AVERAGE GLUCOSE BLD GHB EST-MCNC: 134 MG/DL
GLOBULIN SER-MCNC: 3.9 G/DL (ref 2–4)
GLUCOSE SERPL-MCNC: 76 MG/DL (ref 74–106)
HBA1C MFR BLD HPLC: 6.3 % (ref 4.5–6.6)
HCT VFR BLD AUTO: 42.3 % (ref 40–54)
HDLC SERPL-MCNC: 69 MG/DL (ref 40–60)
HGB BLD-MCNC: 14.2 G/DL (ref 13.5–18)
IMM GRANULOCYTES # BLD AUTO: 0.03 K/UL (ref 0–0.04)
IMM GRANULOCYTES NFR BLD: 0.4 % (ref 0–0.4)
LDLC SERPL CALC-MCNC: 79 MG/DL
LDLC/HDLC SERPL: 1.1 {RATIO}
LYMPHOCYTES # BLD AUTO: 1.55 K/UL (ref 1–4.8)
LYMPHOCYTES NFR BLD AUTO: 21 % (ref 27–41)
MCH RBC QN AUTO: 28.9 PG (ref 27–31)
MCHC RBC AUTO-ENTMCNC: 33.6 G/DL (ref 32–36)
MCV RBC AUTO: 86.2 FL (ref 80–96)
MONOCYTES # BLD AUTO: 0.52 K/UL (ref 0–0.8)
MONOCYTES NFR BLD AUTO: 7 % (ref 2–6)
MPC BLD CALC-MCNC: 11 FL (ref 9.4–12.4)
NEUTROPHILS # BLD AUTO: 5.15 K/UL (ref 1.8–7.7)
NEUTROPHILS NFR BLD AUTO: 69.8 % (ref 53–65)
NONHDLC SERPL-MCNC: 99 MG/DL
NRBC # BLD AUTO: 0 X10E3/UL
NRBC, AUTO (.00): 0 %
PLATELET # BLD AUTO: 218 K/UL (ref 150–400)
POC (AMP) AMPHETAMINE: ABNORMAL
POC (BAR) BARBITURATES: NEGATIVE
POC (BUP) BUPRENORPHINE: NEGATIVE
POC (BZO) BENZODIAZEPINES: NEGATIVE
POC (COC) COCAINE: NEGATIVE
POC (MDMA) METHYLENEDIOXYMETHAMPHETAMINE 3,4: NEGATIVE
POC (MET) METHAMPHETAMINE: NEGATIVE
POC (MOP) OPIATES: NEGATIVE
POC (MTD) METHADONE: NEGATIVE
POC (OXY) OXYCODONE: NEGATIVE
POC (PCP) PHENCYCLIDINE: NEGATIVE
POC (TCA) TRICYCLIC ANTIDEPRESSANTS: ABNORMAL
POC TEMPERATURE (URINE): 92
POC THC: NEGATIVE
POTASSIUM SERPL-SCNC: 4.1 MMOL/L (ref 3.5–5.1)
PROT SERPL-MCNC: 7.6 G/DL (ref 6.4–8.2)
RBC # BLD AUTO: 4.91 M/UL (ref 4.6–6.2)
SODIUM SERPL-SCNC: 141 MMOL/L (ref 136–145)
TRIGL SERPL-MCNC: 98 MG/DL (ref 35–150)
VLDLC SERPL-MCNC: 20 MG/DL
WBC # BLD AUTO: 7.38 K/UL (ref 4.5–11)

## 2023-12-01 PROCEDURE — 83036 HEMOGLOBIN GLYCOSYLATED A1C: CPT | Mod: ,,, | Performed by: CLINICAL MEDICAL LABORATORY

## 2023-12-01 PROCEDURE — 1159F MED LIST DOCD IN RCRD: CPT | Mod: ,,, | Performed by: FAMILY MEDICINE

## 2023-12-01 PROCEDURE — 3008F PR BODY MASS INDEX (BMI) DOCUMENTED: ICD-10-PCS | Mod: ,,, | Performed by: FAMILY MEDICINE

## 2023-12-01 PROCEDURE — 1159F PR MEDICATION LIST DOCUMENTED IN MEDICAL RECORD: ICD-10-PCS | Mod: ,,, | Performed by: FAMILY MEDICINE

## 2023-12-01 PROCEDURE — 80305 DRUG TEST PRSMV DIR OPT OBS: CPT | Mod: QW,,, | Performed by: FAMILY MEDICINE

## 2023-12-01 PROCEDURE — 3044F HG A1C LEVEL LT 7.0%: CPT | Mod: ,,, | Performed by: FAMILY MEDICINE

## 2023-12-01 PROCEDURE — 80061 LIPID PANEL: ICD-10-PCS | Mod: ,,, | Performed by: CLINICAL MEDICAL LABORATORY

## 2023-12-01 PROCEDURE — 80061 LIPID PANEL: CPT | Mod: ,,, | Performed by: CLINICAL MEDICAL LABORATORY

## 2023-12-01 PROCEDURE — 3078F DIAST BP <80 MM HG: CPT | Mod: ,,, | Performed by: FAMILY MEDICINE

## 2023-12-01 PROCEDURE — 3008F BODY MASS INDEX DOCD: CPT | Mod: ,,, | Performed by: FAMILY MEDICINE

## 2023-12-01 PROCEDURE — 99214 OFFICE O/P EST MOD 30 MIN: CPT | Mod: ,,, | Performed by: FAMILY MEDICINE

## 2023-12-01 PROCEDURE — 80053 COMPREHENSIVE METABOLIC PANEL: ICD-10-PCS | Mod: ,,, | Performed by: CLINICAL MEDICAL LABORATORY

## 2023-12-01 PROCEDURE — 80053 COMPREHEN METABOLIC PANEL: CPT | Mod: ,,, | Performed by: CLINICAL MEDICAL LABORATORY

## 2023-12-01 PROCEDURE — 3075F SYST BP GE 130 - 139MM HG: CPT | Mod: ,,, | Performed by: FAMILY MEDICINE

## 2023-12-01 PROCEDURE — 4010F ACE/ARB THERAPY RXD/TAKEN: CPT | Mod: ,,, | Performed by: FAMILY MEDICINE

## 2023-12-01 PROCEDURE — 99214 PR OFFICE/OUTPT VISIT, EST, LEVL IV, 30-39 MIN: ICD-10-PCS | Mod: ,,, | Performed by: FAMILY MEDICINE

## 2023-12-01 PROCEDURE — 3044F PR MOST RECENT HEMOGLOBIN A1C LEVEL <7.0%: ICD-10-PCS | Mod: ,,, | Performed by: FAMILY MEDICINE

## 2023-12-01 PROCEDURE — 3075F PR MOST RECENT SYSTOLIC BLOOD PRESS GE 130-139MM HG: ICD-10-PCS | Mod: ,,, | Performed by: FAMILY MEDICINE

## 2023-12-01 PROCEDURE — 80305 POCT URINE DRUG SCREEN PRESUMP: ICD-10-PCS | Mod: QW,,, | Performed by: FAMILY MEDICINE

## 2023-12-01 PROCEDURE — 1160F PR REVIEW ALL MEDS BY PRESCRIBER/CLIN PHARMACIST DOCUMENTED: ICD-10-PCS | Mod: ,,, | Performed by: FAMILY MEDICINE

## 2023-12-01 PROCEDURE — 3078F PR MOST RECENT DIASTOLIC BLOOD PRESSURE < 80 MM HG: ICD-10-PCS | Mod: ,,, | Performed by: FAMILY MEDICINE

## 2023-12-01 PROCEDURE — 1160F RVW MEDS BY RX/DR IN RCRD: CPT | Mod: ,,, | Performed by: FAMILY MEDICINE

## 2023-12-01 PROCEDURE — 85025 CBC WITH DIFFERENTIAL: ICD-10-PCS | Mod: ,,, | Performed by: CLINICAL MEDICAL LABORATORY

## 2023-12-01 PROCEDURE — 4010F PR ACE/ARB THEARPY RXD/TAKEN: ICD-10-PCS | Mod: ,,, | Performed by: FAMILY MEDICINE

## 2023-12-01 PROCEDURE — 83036 HEMOGLOBIN A1C: ICD-10-PCS | Mod: ,,, | Performed by: CLINICAL MEDICAL LABORATORY

## 2023-12-01 PROCEDURE — 85025 COMPLETE CBC W/AUTO DIFF WBC: CPT | Mod: ,,, | Performed by: CLINICAL MEDICAL LABORATORY

## 2023-12-01 RX ORDER — DEXTROAMPHETAMINE SACCHARATE, AMPHETAMINE ASPARTATE, DEXTROAMPHETAMINE SULFATE AND AMPHETAMINE SULFATE 7.5; 7.5; 7.5; 7.5 MG/1; MG/1; MG/1; MG/1
1 TABLET ORAL 2 TIMES DAILY
Qty: 60 TABLET | Refills: 0 | Status: SHIPPED | OUTPATIENT
Start: 2023-12-01 | End: 2023-12-01 | Stop reason: SDUPTHER

## 2023-12-01 RX ORDER — ALPRAZOLAM 0.5 MG/1
0.5 TABLET ORAL 3 TIMES DAILY
Qty: 90 TABLET | Refills: 2 | Status: SHIPPED | OUTPATIENT
Start: 2023-12-01 | End: 2024-03-06 | Stop reason: SDUPTHER

## 2023-12-01 RX ORDER — DEXTROAMPHETAMINE SACCHARATE, AMPHETAMINE ASPARTATE, DEXTROAMPHETAMINE SULFATE AND AMPHETAMINE SULFATE 7.5; 7.5; 7.5; 7.5 MG/1; MG/1; MG/1; MG/1
1 TABLET ORAL 2 TIMES DAILY
Qty: 60 TABLET | Refills: 0 | Status: SHIPPED | OUTPATIENT
Start: 2023-12-01 | End: 2024-03-06 | Stop reason: SDUPTHER

## 2023-12-01 NOTE — PROGRESS NOTES
Josesito Breaux MD        PATIENT NAME: Osmar Brito  : 1970  DATE: 23  MRN: [id number]      Billing Provider: Josesito Breaux MD  Level of Service: [place] OFFICE/OUTPT VISIT, EST, LEVL IV, 30-39 MIN  Patient PCP Information       Provider PCP Type    Luis Mosley MD General            Reason for Visit / Chief Complaint: ADHD (3 month med check) and Diabetes (3 month check.)       Update PCP  Update Chief Complaint         History of Present Illness / Problem Focused Workflow     Osmar Brito presents to the clinic with ADHD (3 month med check) and Diabetes (3 month check.)     Routine followup.  No significant interval change.  Now on 100 % disability from [employer]    Diabetes  Pertinent negatives for hypoglycemia include no confusion, dizziness, headaches, nervousness/anxiousness, seizures or tremors. Pertinent negatives for diabetes include no chest pain, no polydipsia, no polyphagia, no polyuria and no weakness (global weakness).       Review of Systems     Review of Systems   Constitutional:  Negative for activity change, appetite change, fever and unexpected weight change.   HENT:  Negative for congestion, rhinorrhea, sinus pressure, sinus pain, sore throat and trouble swallowing.    Eyes:  Negative for photophobia, pain, discharge and visual disturbance.   Respiratory:  Negative for cough, chest tightness, wheezing and stridor.    Cardiovascular:  Negative for chest pain, palpitations and leg swelling.   Gastrointestinal:  Negative for abdominal pain, blood in stool, constipation, diarrhea and nausea.   Endocrine: Negative for polydipsia, polyphagia and polyuria.   Genitourinary:  Negative for difficulty urinating, flank pain and hematuria.   Musculoskeletal:  Negative for arthralgias and neck pain.   Skin:  Negative for rash.   Allergic/Immunologic: Negative for food allergies.   Neurological:  Negative for dizziness, tremors, seizures, syncope, weakness (global weakness) and headaches.    Psychiatric/Behavioral:  Positive for decreased concentration and dysphoric mood. Negative for behavioral problems, confusion and hallucinations. The patient is not nervous/anxious.         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 No pertinent surgical history.    Social History    reports that he has been smoking vaping with nicotine. He has been exposed to tobacco smoke. He has never used smokeless tobacco. He reports that he does not drink alcohol and does not use drugs.    Family History  's family history includes Diabetes in his maternal aunt and maternal uncle; Stroke in his mother.    Medications and Allergies     Medications  No outpatient medications have been marked as taking for the 12/1/23 encounter (Office Visit) with Josesito Breaux MD.       Allergies  Review of patient's allergies indicates:  No Known Allergies    Physical Examination     Vitals:    12/01/23 0840   BP: 130/62   Pulse: 102   Resp: 16   Temp: 98.5 °F (36.9 °C)     Physical Exam  Constitutional:       General: He is not in acute distress.     Appearance: Normal appearance.   HENT:      Head: Normocephalic.      Right Ear: Tympanic membrane and ear canal normal.      Left Ear: Tympanic membrane and ear canal normal.      Nose: Nose normal.      Mouth/Throat:      Mouth: Mucous membranes are moist.      Pharynx: No oropharyngeal exudate.   Eyes:      Extraocular Movements: Extraocular movements intact.      Pupils: Pupils are equal, round, and reactive to light.   Cardiovascular:      Rate and Rhythm: Normal rate and regular rhythm.      Heart sounds: No murmur heard.  Pulmonary:      Effort: Pulmonary effort is normal.       Breath sounds: Normal breath sounds. No wheezing.   Abdominal:      General: Abdomen is flat. Bowel sounds are normal.      Palpations: Abdomen is soft.      Hernia: No hernia is present.   Musculoskeletal:         General: Normal range of motion.      Cervical back: Normal range of motion and neck supple.      Right lower leg: No edema.      Left lower leg: No edema.   Lymphadenopathy:      Cervical: No cervical adenopathy.   Skin:     General: Skin is warm and dry.      Coloration: Skin is not jaundiced.      Findings: No lesion.   Neurological:      General: No focal deficit present.      Mental Status: He is alert and oriented to person, place, and time.      Cranial Nerves: No cranial nerve deficit.      Gait: Gait normal.   Psychiatric:         Mood and Affect: Mood normal.         Behavior: Behavior normal.         Judgment: Judgment normal.          Assessment and Plan (including Health Maintenance)      Problem List  Smart Sets  Document Outside HM   :    Plan:     1. Attention deficit disorder (ADD) without hyperactivity  The current medical regimen is effective;  continue present plan and medications.    2. Encounter for long-term (current) use of other medications  The current medical regimen is effective;  continue present plan and medications.  -     POCT Urine Drug Screen Presump    3. Type 2 diabetes mellitus without complication, without long-term current use of insulin  The current medical regimen is effective;  continue present plan and medications.  -     Hemoglobin A1C    4. Hypertension, unspecified type  The current medical regimen is effective;  continue present plan and medications.  -     CBC Auto Differential  -     Comprehensive Metabolic Panel  -     Lipid Panel    5. Panic disorder (episodic paroxysmal anxiety)  The current medical regimen is effective;  continue present plan and medications.          Health Maintenance Due   Topic Date Due    Hepatitis C Screening  Never done     COVID-19 Vaccine (1) Never done    Diabetes Urine Screening  Never done    Pneumococcal Vaccines (Age 0-64) (1 - PCV) Never done    HIV Screening  Never done    TETANUS VACCINE  Never done    Low Dose Statin  05/11/2022    Shingles Vaccine (1 of 2) Never done    Colorectal Cancer Screening  Never done    Hemoglobin A1c  11/26/2023       1. Attention deficit disorder (ADD) without hyperactivity  -     Discontinue: dextroamphetamine-amphetamine 30 mg Tab; Take 1 tablet (30 mg total) by mouth 2 (two) times a day.  Dispense: 60 tablet; Refill: 0  -     Discontinue: dextroamphetamine-amphetamine 30 mg Tab; Take 1 tablet (30 mg total) by mouth 2 (two) times a day.  Dispense: 60 tablet; Refill: 0  -     dextroamphetamine-amphetamine 30 mg Tab; Take 1 tablet (30 mg total) by mouth 2 (two) times a day.  Dispense: 60 tablet; Refill: 0    2. Encounter for long-term (current) use of other medications  -     POCT Urine Drug Screen Presump    3. Type 2 diabetes mellitus without complication, without long-term current use of insulin  -     Hemoglobin A1C  -     Protein/Creatinine Ratio, Urine    4. Hypertension, unspecified type  -     CBC Auto Differential  -     Comprehensive Metabolic Panel  -     Lipid Panel    5. Panic disorder (episodic paroxysmal anxiety)  -     ALPRAZolam (XANAX) 0.5 MG tablet; Take 1 tablet (0.5 mg total) by mouth 3 (three) times daily.  Dispense: 90 tablet; Refill: 2    6. Prostate cancer screening  -     PSA, Screening; Future; Expected date: 12/01/2023         Health Maintenance Topics with due status: Not Due       Topic Last Completion Date    Foot Exam 05/26/2023    Lipid Panel 05/26/2023    Eye Exam 11/18/2023       Future Appointments   Date Time Provider Department Center   12/1/2023 10:50 AM Josesito Breaux MD [facility]   2/27/2024  8:30 AM [facility]        There are no Patient Instructions on file for this visit.  Follow up in about 3  months (around 3/1/2024) for routine followup.     Signature:  Josesito Breaux MD      Date of encounter: 12/1/23

## 2024-01-08 ENCOUNTER — OFFICE VISIT (OUTPATIENT)
Dept: FAMILY MEDICINE | Facility: CLINIC | Age: 54
End: 2024-01-08
Payer: COMMERCIAL

## 2024-01-08 VITALS
SYSTOLIC BLOOD PRESSURE: 150 MMHG | DIASTOLIC BLOOD PRESSURE: 84 MMHG | BODY MASS INDEX: 41.23 KG/M2 | HEART RATE: 107 BPM | TEMPERATURE: 99 F | WEIGHT: 288 LBS | RESPIRATION RATE: 16 BRPM | HEIGHT: 70 IN | OXYGEN SATURATION: 97 %

## 2024-01-08 DIAGNOSIS — F98.8 ATTENTION DEFICIT DISORDER (ADD) WITHOUT HYPERACTIVITY: Primary | ICD-10-CM

## 2024-01-08 PROCEDURE — 3077F SYST BP >= 140 MM HG: CPT | Mod: CPTII,,, | Performed by: FAMILY MEDICINE

## 2024-01-08 PROCEDURE — 3079F DIAST BP 80-89 MM HG: CPT | Mod: CPTII,,, | Performed by: FAMILY MEDICINE

## 2024-01-08 PROCEDURE — 1160F RVW MEDS BY RX/DR IN RCRD: CPT | Mod: CPTII,,, | Performed by: FAMILY MEDICINE

## 2024-01-08 PROCEDURE — 1159F MED LIST DOCD IN RCRD: CPT | Mod: CPTII,,, | Performed by: FAMILY MEDICINE

## 2024-01-08 PROCEDURE — 99213 OFFICE O/P EST LOW 20 MIN: CPT | Mod: ,,, | Performed by: FAMILY MEDICINE

## 2024-01-08 PROCEDURE — 3008F BODY MASS INDEX DOCD: CPT | Mod: CPTII,,, | Performed by: FAMILY MEDICINE

## 2024-01-08 RX ORDER — DEXTROAMPHETAMINE SACCHARATE, AMPHETAMINE ASPARTATE MONOHYDRATE, DEXTROAMPHETAMINE SULFATE AND AMPHETAMINE SULFATE 7.5; 7.5; 7.5; 7.5 MG/1; MG/1; MG/1; MG/1
30 CAPSULE, EXTENDED RELEASE ORAL EVERY MORNING
Qty: 30 CAPSULE | Refills: 0 | Status: SHIPPED | OUTPATIENT
Start: 2024-01-08

## 2024-01-08 NOTE — PROGRESS NOTES
Josesito Breaux MD        PATIENT NAME: Osmar Brito  : 1970  DATE: 24  MRN: [id number]      Billing Provider: Josesito Breaux MD  Level of Service: [place] OFFICE/OUTPT VISIT, EST, LEVL III, 20-29 MIN  Patient PCP Information       Provider PCP Type    Luis Mosley MD General            Reason for Visit / Chief Complaint: Anxiety (Disability paper work PTSD)       Update PCP  Update Chief Complaint         History of Present Illness / Problem Focused Workflow     Osmar Brito presents to the clinic with Anxiety (Disability paper work PTSD)     Routine followup.  No significant interval change.  Needs XR adderall due to availabiluty    Anxiety  Symptoms include decreased concentration. Patient reports no chest pain, confusion, dizziness, nausea, nervous/anxious behavior or palpitations.           Review of Systems     Review of Systems   Constitutional:  Negative for activity change, appetite change, fever and unexpected weight change.   HENT:  Negative for congestion, hearing loss, rhinorrhea, sinus pressure, sinus pain, sore throat and trouble swallowing.    Eyes:  Negative for photophobia, pain, discharge and visual disturbance.   Respiratory:  Negative for cough, chest tightness, wheezing and stridor.    Cardiovascular:  Negative for chest pain, palpitations and leg swelling.   Gastrointestinal:  Negative for abdominal pain, blood in stool, constipation, diarrhea, nausea and vomiting.   Endocrine: Negative for polydipsia, polyphagia and polyuria.   Genitourinary:  Negative for difficulty urinating, flank pain, hematuria and urgency.   Musculoskeletal:  Negative for arthralgias, joint swelling and neck pain.   Skin:  Negative for rash.   Allergic/Immunologic: Negative for food allergies.   Neurological:  Negative for dizziness, tremors, seizures, syncope, weakness and headaches.   Psychiatric/Behavioral:  Positive for decreased concentration. Negative for behavioral problems, confusion, dysphoric  mood and hallucinations. The patient is not nervous/anxious.         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 No pertinent surgical history.    Social History    reports that he has been smoking vaping with nicotine. He has been exposed to tobacco smoke. He has never used smokeless tobacco. He reports that he does not drink alcohol and does not use drugs.    Family History  's family history includes Diabetes in his maternal aunt and maternal uncle; Stroke in his mother.    Medications and Allergies     Medications  No outpatient medications have been marked as taking for the 1/8/24 encounter (Office Visit) with Josesito Breaux MD.       Allergies  Review of patient's allergies indicates:  No Known Allergies    Physical Examination     Vitals:    01/08/24 1528   BP: (!) 150/84   Pulse: 107   Resp: 16   Temp: 98.6 °F (37 °C)     Physical Exam  Constitutional:       General: He is not in acute distress.     Appearance: Normal appearance.   HENT:      Head: Normocephalic.      Right Ear: Tympanic membrane and ear canal normal.      Left Ear: Tympanic membrane and ear canal normal.      Nose: Nose normal.      Mouth/Throat:      Mouth: Mucous membranes are moist.      Pharynx: No oropharyngeal exudate.   Eyes:      Extraocular Movements: Extraocular movements intact.      Pupils: Pupils are equal, round, and reactive to light.   Cardiovascular:      Rate and Rhythm: Normal rate and regular rhythm.      Heart sounds: No murmur heard.  Pulmonary:      Effort: Pulmonary effort is normal.      Breath sounds: Normal breath sounds. No wheezing.   Abdominal:      General: Abdomen is flat. Bowel sounds are normal.       Palpations: Abdomen is soft.      Hernia: No hernia is present.   Musculoskeletal:         General: Normal range of motion.      Cervical back: Normal range of motion and neck supple.      Right lower leg: No edema.      Left lower leg: No edema.   Lymphadenopathy:      Cervical: No cervical adenopathy.   Skin:     General: Skin is warm and dry.      Coloration: Skin is not jaundiced.      Findings: No lesion.   Neurological:      General: No focal deficit present.      Mental Status: He is alert and oriented to person, place, and time.      Cranial Nerves: No cranial nerve deficit.      Gait: Gait normal.   Psychiatric:         Mood and Affect: Mood normal.         Behavior: Behavior normal.         Judgment: Judgment normal.          Assessment and Plan (including Health Maintenance)      Problem List  Smart Sets  Document Outside HM   :    Plan:           Health Maintenance Due   Topic Date Due    Hepatitis C Screening  Never done    COVID-19 Vaccine (1) Never done    Diabetes Urine Screening  Never done    Pneumococcal Vaccines (Age 0-64) (1 - PCV) Never done    Eye Exam  Never done    HIV Screening  Never done    TETANUS VACCINE  Never done    Low Dose Statin  05/11/2022    Shingles Vaccine (1 of 2) Never done    Colorectal Cancer Screening  Never done       1. Attention deficit disorder (ADD) without hyperactivity  -     dextroamphetamine-amphetamine (ADDERALL XR) 30 MG 24 hr capsule; Take 1 capsule (30 mg total) by mouth every morning.  Dispense: 30 capsule; Refill: 0         Health Maintenance Topics with due status: Not Due       Topic Last Completion Date    Foot Exam 05/26/2023    Lipid Panel 12/01/2023    Hemoglobin A1c 12/01/2023       Future Appointments   Date Time Provider Department Center   3/1/2024  7:40 AM Josesito Breaux MD [facility]        There are no Patient Instructions on file for this visit.  Follow up in about 3 months (around 4/8/2024), or if symptoms worsen or  fail to improve.     Signature:  Josesito Breaux MD      Date of encounter: 1/8/24

## 2024-02-28 DIAGNOSIS — Z13.220 SCREENING FOR LIPOID DISORDERS: ICD-10-CM

## 2024-02-28 DIAGNOSIS — Z13.1 SCREENING FOR DIABETES MELLITUS: Primary | ICD-10-CM

## 2024-03-06 ENCOUNTER — OFFICE VISIT (OUTPATIENT)
Dept: FAMILY MEDICINE | Facility: CLINIC | Age: 54
End: 2024-03-06
Payer: COMMERCIAL

## 2024-03-06 VITALS
BODY MASS INDEX: 41.23 KG/M2 | RESPIRATION RATE: 20 BRPM | DIASTOLIC BLOOD PRESSURE: 82 MMHG | SYSTOLIC BLOOD PRESSURE: 147 MMHG | OXYGEN SATURATION: 97 % | WEIGHT: 288 LBS | HEIGHT: 70 IN | HEART RATE: 90 BPM

## 2024-03-06 DIAGNOSIS — Z12.5 PROSTATE CANCER SCREENING: ICD-10-CM

## 2024-03-06 DIAGNOSIS — Z13.220 SCREENING FOR LIPOID DISORDERS: ICD-10-CM

## 2024-03-06 DIAGNOSIS — Z79.899 ENCOUNTER FOR LONG-TERM (CURRENT) USE OF OTHER MEDICATIONS: ICD-10-CM

## 2024-03-06 DIAGNOSIS — F98.8 ATTENTION DEFICIT DISORDER (ADD) WITHOUT HYPERACTIVITY: Primary | ICD-10-CM

## 2024-03-06 DIAGNOSIS — F41.0 PANIC DISORDER (EPISODIC PAROXYSMAL ANXIETY): ICD-10-CM

## 2024-03-06 DIAGNOSIS — F98.8 ATTENTION DEFICIT DISORDER (ADD) WITHOUT HYPERACTIVITY: ICD-10-CM

## 2024-03-06 DIAGNOSIS — E11.9 TYPE 2 DIABETES MELLITUS WITHOUT COMPLICATION, WITHOUT LONG-TERM CURRENT USE OF INSULIN: ICD-10-CM

## 2024-03-06 DIAGNOSIS — K04.7 DENTAL INFECTION: ICD-10-CM

## 2024-03-06 DIAGNOSIS — N52.9 ERECTILE DYSFUNCTION, UNSPECIFIED ERECTILE DYSFUNCTION TYPE: ICD-10-CM

## 2024-03-06 DIAGNOSIS — I10 HYPERTENSION, UNSPECIFIED TYPE: ICD-10-CM

## 2024-03-06 LAB
ALBUMIN SERPL BCP-MCNC: 3.8 G/DL (ref 3.5–5)
ALBUMIN/GLOB SERPL: 1 {RATIO}
ALP SERPL-CCNC: 96 U/L (ref 45–115)
ALT SERPL W P-5'-P-CCNC: 34 U/L (ref 16–61)
ANION GAP SERPL CALCULATED.3IONS-SCNC: 9 MMOL/L (ref 7–16)
AST SERPL W P-5'-P-CCNC: 23 U/L (ref 15–37)
BILIRUB SERPL-MCNC: 0.7 MG/DL (ref ?–1.2)
BUN SERPL-MCNC: 14 MG/DL (ref 7–18)
BUN/CREAT SERPL: 11 (ref 6–20)
CALCIUM SERPL-MCNC: 9.4 MG/DL (ref 8.5–10.1)
CHLORIDE SERPL-SCNC: 106 MMOL/L (ref 98–107)
CHOLEST SERPL-MCNC: 161 MG/DL (ref 0–200)
CHOLEST/HDLC SERPL: 2.6 {RATIO}
CO2 SERPL-SCNC: 30 MMOL/L (ref 21–32)
CREAT SERPL-MCNC: 1.31 MG/DL (ref 0.7–1.3)
CTP QC/QA: YES
EGFR (NO RACE VARIABLE) (RUSH/TITUS): 66 ML/MIN/1.73M2
GLOBULIN SER-MCNC: 3.9 G/DL (ref 2–4)
GLUCOSE SERPL-MCNC: 98 MG/DL (ref 74–106)
HDLC SERPL-MCNC: 62 MG/DL (ref 40–60)
LDLC SERPL CALC-MCNC: 92 MG/DL
LDLC/HDLC SERPL: 1.5 {RATIO}
NONHDLC SERPL-MCNC: 99 MG/DL
POC (AMP) AMPHETAMINE: ABNORMAL
POC (BAR) BARBITURATES: NEGATIVE
POC (BUP) BUPRENORPHINE: NEGATIVE
POC (BZO) BENZODIAZEPINES: ABNORMAL
POC (COC) COCAINE: NEGATIVE
POC (MDMA) METHYLENEDIOXYMETHAMPHETAMINE 3,4: NEGATIVE
POC (MET) METHAMPHETAMINE: NEGATIVE
POC (MOP) OPIATES: NEGATIVE
POC (MTD) METHADONE: NEGATIVE
POC (OXY) OXYCODONE: NEGATIVE
POC (PCP) PHENCYCLIDINE: NEGATIVE
POC (TCA) TRICYCLIC ANTIDEPRESSANTS: ABNORMAL
POC TEMPERATURE (URINE): 90
POC THC: NEGATIVE
POTASSIUM SERPL-SCNC: 4.2 MMOL/L (ref 3.5–5.1)
PROT SERPL-MCNC: 7.7 G/DL (ref 6.4–8.2)
PSA SERPL-MCNC: 0.54 NG/ML
SODIUM SERPL-SCNC: 141 MMOL/L (ref 136–145)
TRIGL SERPL-MCNC: 35 MG/DL (ref 35–150)
VLDLC SERPL-MCNC: 7 MG/DL

## 2024-03-06 PROCEDURE — 82570 ASSAY OF URINE CREATININE: CPT | Mod: ,,, | Performed by: CLINICAL MEDICAL LABORATORY

## 2024-03-06 PROCEDURE — 1159F MED LIST DOCD IN RCRD: CPT | Mod: CPTII,,, | Performed by: FAMILY MEDICINE

## 2024-03-06 PROCEDURE — 80061 LIPID PANEL: CPT | Mod: ,,, | Performed by: CLINICAL MEDICAL LABORATORY

## 2024-03-06 PROCEDURE — 3079F DIAST BP 80-89 MM HG: CPT | Mod: CPTII,,, | Performed by: FAMILY MEDICINE

## 2024-03-06 PROCEDURE — 3077F SYST BP >= 140 MM HG: CPT | Mod: CPTII,,, | Performed by: FAMILY MEDICINE

## 2024-03-06 PROCEDURE — 83036 HEMOGLOBIN GLYCOSYLATED A1C: CPT | Mod: ,,, | Performed by: CLINICAL MEDICAL LABORATORY

## 2024-03-06 PROCEDURE — 99214 OFFICE O/P EST MOD 30 MIN: CPT | Mod: ,,, | Performed by: FAMILY MEDICINE

## 2024-03-06 PROCEDURE — 80305 DRUG TEST PRSMV DIR OPT OBS: CPT | Mod: QW,,, | Performed by: FAMILY MEDICINE

## 2024-03-06 PROCEDURE — 1160F RVW MEDS BY RX/DR IN RCRD: CPT | Mod: CPTII,,, | Performed by: FAMILY MEDICINE

## 2024-03-06 PROCEDURE — G0103 PSA SCREENING: HCPCS | Mod: ,,, | Performed by: CLINICAL MEDICAL LABORATORY

## 2024-03-06 PROCEDURE — 82043 UR ALBUMIN QUANTITATIVE: CPT | Mod: ,,, | Performed by: CLINICAL MEDICAL LABORATORY

## 2024-03-06 PROCEDURE — 3008F BODY MASS INDEX DOCD: CPT | Mod: CPTII,,, | Performed by: FAMILY MEDICINE

## 2024-03-06 PROCEDURE — 80053 COMPREHEN METABOLIC PANEL: CPT | Mod: ,,, | Performed by: CLINICAL MEDICAL LABORATORY

## 2024-03-06 RX ORDER — DEXTROAMPHETAMINE SACCHARATE, AMPHETAMINE ASPARTATE, DEXTROAMPHETAMINE SULFATE AND AMPHETAMINE SULFATE 7.5; 7.5; 7.5; 7.5 MG/1; MG/1; MG/1; MG/1
1 TABLET ORAL 2 TIMES DAILY
Qty: 60 TABLET | Refills: 0 | Status: SHIPPED | OUTPATIENT
Start: 2024-03-06 | End: 2024-03-06 | Stop reason: SDUPTHER

## 2024-03-06 RX ORDER — DEXTROAMPHETAMINE SACCHARATE, AMPHETAMINE ASPARTATE, DEXTROAMPHETAMINE SULFATE AND AMPHETAMINE SULFATE 7.5; 7.5; 7.5; 7.5 MG/1; MG/1; MG/1; MG/1
1 TABLET ORAL 2 TIMES DAILY
Qty: 60 TABLET | Refills: 0 | Status: SHIPPED | OUTPATIENT
Start: 2024-03-06 | End: 2024-04-08 | Stop reason: SDUPTHER

## 2024-03-06 RX ORDER — SILDENAFIL 100 MG/1
100 TABLET, FILM COATED ORAL
Qty: 30 TABLET | Refills: 11 | Status: SHIPPED | OUTPATIENT
Start: 2024-03-06 | End: 2024-04-08 | Stop reason: SDUPTHER

## 2024-03-06 RX ORDER — AMOXICILLIN AND CLAVULANATE POTASSIUM 875; 125 MG/1; MG/1
1 TABLET, FILM COATED ORAL EVERY 12 HOURS
Qty: 20 TABLET | Refills: 0 | Status: SHIPPED | OUTPATIENT
Start: 2024-03-06 | End: 2024-04-08

## 2024-03-06 RX ORDER — ALPRAZOLAM 0.5 MG/1
0.5 TABLET ORAL 3 TIMES DAILY
Qty: 90 TABLET | Refills: 2 | Status: SHIPPED | OUTPATIENT
Start: 2024-03-06 | End: 2024-03-06

## 2024-03-06 RX ORDER — ALPRAZOLAM 0.5 MG/1
0.5 TABLET ORAL 3 TIMES DAILY
Qty: 90 TABLET | Refills: 2 | Status: SHIPPED | OUTPATIENT
Start: 2024-03-06 | End: 2024-04-08 | Stop reason: SDUPTHER

## 2024-03-06 NOTE — PROGRESS NOTES
Josesito Breaux MD        PATIENT NAME: Osmar Brito  : 1970  DATE: 3/6/24  MRN: [id number]      Billing Provider: Josesito Breaux MD  Level of Service: [place] OFFICE/OUTPT VISIT, EST, LEVL IV, 30-39 MIN  Patient PCP Information       Provider PCP Type    Luis Mosley MD General            Reason for Visit / Chief Complaint: ADHD (Check up for refill), Anxiety (refill), and Diabetes (Check up)       Update PCP  Update Chief Complaint         History of Present Illness / Problem Focused Workflow     Osmar Brito presents to the clinic with ADHD (Check up for refill), Anxiety (refill), and Diabetes (Check up)     Routine followup.  No significant interval change.Has dental infection.  To have two removed tomorrow.  Has recurring nightmares.  Has trouble sleeping thru night.      Anxiety  Symptoms include decreased concentration. Patient reports no chest pain, confusion, dizziness, nausea, nervous/anxious behavior or palpitations.       Diabetes  Pertinent negatives for hypoglycemia include no confusion, dizziness, headaches, nervousness/anxiousness, seizures or tremors. Pertinent negatives for diabetes include no chest pain, no polydipsia, no polyphagia, no polyuria and no weakness (global weakness).       Review of Systems     Review of Systems   Constitutional:  Negative for activity change, appetite change, fever and unexpected weight change.   HENT:  Negative for congestion, rhinorrhea, sinus pressure, sinus pain, sore throat and trouble swallowing.    Eyes:  Negative for photophobia, pain, discharge and visual disturbance.   Respiratory:  Negative for cough, chest tightness, wheezing and stridor.    Cardiovascular:  Negative for chest pain, palpitations and leg swelling.   Gastrointestinal:  Negative for abdominal pain, blood in stool, constipation, diarrhea and nausea.   Endocrine: Negative for polydipsia, polyphagia and polyuria.   Genitourinary:  Negative for difficulty urinating, flank pain and  hematuria.   Musculoskeletal:  Negative for arthralgias and neck pain.   Skin:  Negative for rash.   Allergic/Immunologic: Negative for food allergies.   Neurological:  Negative for dizziness, tremors, seizures, syncope, weakness (global weakness) and headaches.   Psychiatric/Behavioral:  Positive for decreased concentration, dysphoric mood and sleep disturbance. Negative for behavioral problems, confusion and hallucinations. The patient is not nervous/anxious.         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 No pertinent surgical history.    Social History    reports that he has been smoking vaping with nicotine. He has been exposed to tobacco smoke. He has never used smokeless tobacco. He reports that he does not drink alcohol and does not use drugs.    Family History  's family history includes Diabetes in his maternal aunt and maternal uncle; Stroke in his mother.    Medications and Allergies     Medications  No outpatient medications have been marked as taking for the 3/6/24 encounter (Office Visit) with Josesito Breaux MD.       Allergies  Review of patient's allergies indicates:  No Known Allergies    Physical Examination     Vitals:    03/06/24 0930   BP: (!) 147/82   Pulse: 90   Resp: 20     Physical Exam  Constitutional:       General: He is not in acute distress.     Appearance: Normal appearance.   HENT:      Head: Normocephalic.      Right Ear: Tympanic membrane and ear canal normal.      Left Ear: Tympanic membrane and ear canal normal.      Nose: Nose normal.      Mouth/Throat:      Mouth: Mucous membranes are moist.      Pharynx: No oropharyngeal exudate.   Eyes:      Extraocular  Movements: Extraocular movements intact.      Pupils: Pupils are equal, round, and reactive to light.   Cardiovascular:      Rate and Rhythm: Normal rate and regular rhythm.      Heart sounds: No murmur heard.  Pulmonary:      Effort: Pulmonary effort is normal.      Breath sounds: Normal breath sounds. No wheezing.   Abdominal:      General: Abdomen is flat. Bowel sounds are normal.      Palpations: Abdomen is soft.      Hernia: No hernia is present.   Musculoskeletal:         General: Normal range of motion.      Cervical back: Normal range of motion and neck supple.      Right lower leg: No edema.      Left lower leg: No edema.   Lymphadenopathy:      Cervical: No cervical adenopathy.   Skin:     General: Skin is warm and dry.      Coloration: Skin is not jaundiced.      Findings: No lesion.   Neurological:      General: No focal deficit present.      Mental Status: He is alert and oriented to person, place, and time.      Cranial Nerves: No cranial nerve deficit.      Gait: Gait normal.   Psychiatric:         Behavior: Behavior normal.         Judgment: Judgment normal.      Comments: Flat affect        Assessment and Plan (including Health Maintenance)      Problem List  Smart Sets  Document Outside HM   :    Plan:     1. Encounter for long-term (current) use of other medications  The current medical regimen is effective;  continue present plan and medications.  -     POCT Urine Drug Screen Presump    2. Attention deficit disorder (ADD) without hyperactivity  The current medical regimen is effective;  continue present plan and medications.    3. Panic disorder (episodic paroxysmal anxiety)  The current medical regimen is effective;  continue present plan and medications.    4. Erectile dysfunction, unspecified erectile dysfunction type  The current medical regimen is effective;  continue present plan and medications.    5. Screening for lipoid disorders    -     Lipid Panel    6. Type 2 diabetes mellitus  without complication, without long-term current use of insulin  The current medical regimen is effective;  continue present plan and medications.  -     Glucose, Fasting  -     Microalbumin/Creatinine Ratio, Urine  -     Hemoglobin A1C    7. Prostate cancer screening    -     PSA, Screening          Health Maintenance Due   Topic Date Due    Hepatitis C Screening  Never done    COVID-19 Vaccine (1) Never done    Diabetes Urine Screening  Never done    Pneumococcal Vaccines (Age 0-64) (1 of 2 - PCV) Never done    Eye Exam  Never done    HIV Screening  Never done    TETANUS VACCINE  10/26/2018    Shingles Vaccine (1 of 2) Never done    Colorectal Cancer Screening  Never done       1. Attention deficit disorder (ADD) without hyperactivity  -     dextroamphetamine-amphetamine 30 mg Tab; Take 1 tablet (30 mg total) by mouth 2 (two) times a day.  Dispense: 60 tablet; Refill: 0    2. Encounter for long-term (current) use of other medications  -     POCT Urine Drug Screen Presump    3. Panic disorder (episodic paroxysmal anxiety)  -     ALPRAZolam (XANAX) 0.5 MG tablet; Take 1 tablet (0.5 mg total) by mouth 3 (three) times daily.  Dispense: 90 tablet; Refill: 2    4. Erectile dysfunction, unspecified erectile dysfunction type  -     sildenafiL (VIAGRA) 100 MG tablet; Take 1 tablet (100 mg total) by mouth as needed for Erectile Dysfunction.  Dispense: 30 tablet; Refill: 11    5. Screening for lipoid disorders  -     Lipid Panel    6. Type 2 diabetes mellitus without complication, without long-term current use of insulin  -     Glucose, Fasting  -     Microalbumin/Creatinine Ratio, Urine  -     Hemoglobin A1C    7. Prostate cancer screening  -     PSA, Screening    8. Hypertension, unspecified type  -     Comprehensive Metabolic Panel; Future; Expected date: 09/06/2024         Health Maintenance Topics with due status: Not Due       Topic Last Completion Date    Foot Exam 05/26/2023    Lipid Panel 12/01/2023     Hemoglobin A1c 12/01/2023    Low Dose Statin 02/01/2024       Future Appointments   Date Time Provider Department Center   3/6/2024  2:40 PM [facility]   4/1/2024  7:30 AM [facility]   3/10/2025  2:40 PM Josesito Breaux MD [facility]        There are no Patient Instructions on file for this visit.  Follow up in about 3 months (around 6/6/2024) for routine followup.     Signature:  Josesito Breaux MD      Date of encounter: 3/6/24

## 2024-03-07 LAB
CREAT UR-MCNC: 58 MG/DL (ref 39–259)
EST. AVERAGE GLUCOSE BLD GHB EST-MCNC: 140 MG/DL
HBA1C MFR BLD HPLC: 6.5 % (ref 4.5–6.6)
MICROALBUMIN UR-MCNC: 0.5 MG/DL (ref 0–2.8)
MICROALBUMIN/CREAT RATIO PNL UR: 8.6 MG/G (ref 0–30)

## 2024-04-08 ENCOUNTER — OFFICE VISIT (OUTPATIENT)
Dept: FAMILY MEDICINE | Facility: CLINIC | Age: 54
End: 2024-04-08
Payer: COMMERCIAL

## 2024-04-08 VITALS
DIASTOLIC BLOOD PRESSURE: 88 MMHG | HEART RATE: 83 BPM | BODY MASS INDEX: 41.8 KG/M2 | RESPIRATION RATE: 16 BRPM | TEMPERATURE: 98 F | HEIGHT: 70 IN | OXYGEN SATURATION: 98 % | WEIGHT: 292 LBS | SYSTOLIC BLOOD PRESSURE: 150 MMHG

## 2024-04-08 DIAGNOSIS — F41.0 PANIC DISORDER (EPISODIC PAROXYSMAL ANXIETY): ICD-10-CM

## 2024-04-08 DIAGNOSIS — N52.9 ERECTILE DYSFUNCTION, UNSPECIFIED ERECTILE DYSFUNCTION TYPE: ICD-10-CM

## 2024-04-08 DIAGNOSIS — F43.11 POST-TRAUMATIC STRESS DISORDER, ACUTE: ICD-10-CM

## 2024-04-08 DIAGNOSIS — I10 PRIMARY HYPERTENSION: ICD-10-CM

## 2024-04-08 DIAGNOSIS — F98.8 ATTENTION DEFICIT DISORDER (ADD) WITHOUT HYPERACTIVITY: ICD-10-CM

## 2024-04-08 DIAGNOSIS — E11.9 TYPE 2 DIABETES MELLITUS WITHOUT COMPLICATION, WITHOUT LONG-TERM CURRENT USE OF INSULIN: Primary | ICD-10-CM

## 2024-04-08 PROCEDURE — 3044F HG A1C LEVEL LT 7.0%: CPT | Mod: CPTII,,, | Performed by: FAMILY MEDICINE

## 2024-04-08 PROCEDURE — 3061F NEG MICROALBUMINURIA REV: CPT | Mod: CPTII,,, | Performed by: FAMILY MEDICINE

## 2024-04-08 PROCEDURE — 3008F BODY MASS INDEX DOCD: CPT | Mod: CPTII,,, | Performed by: FAMILY MEDICINE

## 2024-04-08 PROCEDURE — 1159F MED LIST DOCD IN RCRD: CPT | Mod: CPTII,,, | Performed by: FAMILY MEDICINE

## 2024-04-08 PROCEDURE — 3079F DIAST BP 80-89 MM HG: CPT | Mod: CPTII,,, | Performed by: FAMILY MEDICINE

## 2024-04-08 PROCEDURE — 3066F NEPHROPATHY DOC TX: CPT | Mod: CPTII,,, | Performed by: FAMILY MEDICINE

## 2024-04-08 PROCEDURE — 3077F SYST BP >= 140 MM HG: CPT | Mod: CPTII,,, | Performed by: FAMILY MEDICINE

## 2024-04-08 PROCEDURE — 99214 OFFICE O/P EST MOD 30 MIN: CPT | Mod: ,,, | Performed by: FAMILY MEDICINE

## 2024-04-08 RX ORDER — LATANOPROST 50 UG/ML
1 SOLUTION/ DROPS OPHTHALMIC DAILY
COMMUNITY
End: 2024-04-08 | Stop reason: SDUPTHER

## 2024-04-08 RX ORDER — ALPRAZOLAM 0.5 MG/1
0.5 TABLET ORAL 3 TIMES DAILY
Qty: 90 TABLET | Refills: 2 | Status: CANCELLED | OUTPATIENT
Start: 2024-04-08

## 2024-04-08 RX ORDER — LATANOPROST 50 UG/ML
1 SOLUTION/ DROPS OPHTHALMIC DAILY
Qty: 2.5 ML | Refills: 3 | Status: SHIPPED | OUTPATIENT
Start: 2024-04-08

## 2024-04-08 RX ORDER — DEXTROAMPHETAMINE SACCHARATE, AMPHETAMINE ASPARTATE, DEXTROAMPHETAMINE SULFATE AND AMPHETAMINE SULFATE 7.5; 7.5; 7.5; 7.5 MG/1; MG/1; MG/1; MG/1
1 TABLET ORAL 2 TIMES DAILY
Qty: 60 TABLET | Refills: 0 | Status: SHIPPED | OUTPATIENT
Start: 2024-04-08 | End: 2024-04-08 | Stop reason: SDUPTHER

## 2024-04-08 RX ORDER — DEXTROAMPHETAMINE SACCHARATE, AMPHETAMINE ASPARTATE, DEXTROAMPHETAMINE SULFATE AND AMPHETAMINE SULFATE 7.5; 7.5; 7.5; 7.5 MG/1; MG/1; MG/1; MG/1
1 TABLET ORAL 2 TIMES DAILY
Qty: 60 TABLET | Refills: 0 | Status: SHIPPED | OUTPATIENT
Start: 2024-04-08

## 2024-04-08 RX ORDER — DEXTROAMPHETAMINE SACCHARATE, AMPHETAMINE ASPARTATE, DEXTROAMPHETAMINE SULFATE AND AMPHETAMINE SULFATE 7.5; 7.5; 7.5; 7.5 MG/1; MG/1; MG/1; MG/1
1 TABLET ORAL 2 TIMES DAILY
Qty: 60 TABLET | Refills: 0 | Status: CANCELLED | OUTPATIENT
Start: 2024-04-08

## 2024-04-08 RX ORDER — ALPRAZOLAM 0.5 MG/1
0.5 TABLET ORAL 3 TIMES DAILY
Qty: 90 TABLET | Refills: 2 | Status: SHIPPED | OUTPATIENT
Start: 2024-04-08

## 2024-04-08 RX ORDER — SILDENAFIL 100 MG/1
100 TABLET, FILM COATED ORAL
Qty: 30 TABLET | Refills: 11 | Status: SHIPPED | OUTPATIENT
Start: 2024-04-08

## 2024-04-08 RX ORDER — SILDENAFIL 100 MG/1
100 TABLET, FILM COATED ORAL
Qty: 30 TABLET | Refills: 11 | Status: CANCELLED | OUTPATIENT
Start: 2024-04-08

## 2024-04-08 NOTE — PROGRESS NOTES
Josesito Breaux MD        PATIENT NAME: Osmar Brito  : 1970  DATE: 24  MRN: [id number]      Billing Provider: Josesito Breaux MD  Level of Service: [place] OFFICE/OUTPT VISIT, EST, LEVL IV, 30-39 MIN  Patient PCP Information       Provider PCP Type    Luis Mosley MD General            Reason for Visit / Chief Complaint: ADHD (3 month med check) and Diabetes (3month check)       Update PCP  Update Chief Complaint         History of Present Illness / Problem Focused Workflow     Osmar Brito presents to the clinic with ADHD (3 month med check) and Diabetes (3month check)     Routine followup.  No significant interval change      Diabetes  Pertinent negatives for hypoglycemia include no confusion, dizziness, headaches, nervousness/anxiousness, seizures or tremors. Pertinent negatives for diabetes include no chest pain, no polydipsia, no polyphagia, no polyuria and no weakness (global weakness).       Review of Systems     Review of Systems   Constitutional:  Negative for activity change, appetite change, fever and unexpected weight change.   HENT:  Negative for congestion, rhinorrhea, sinus pressure, sinus pain, sore throat and trouble swallowing.    Eyes:  Negative for photophobia, pain, discharge and visual disturbance.   Respiratory:  Negative for cough, chest tightness, wheezing and stridor.    Cardiovascular:  Negative for chest pain, palpitations and leg swelling.   Gastrointestinal:  Negative for abdominal pain, blood in stool, constipation, diarrhea and nausea.   Endocrine: Negative for polydipsia, polyphagia and polyuria.   Genitourinary:  Negative for difficulty urinating, flank pain and hematuria.   Musculoskeletal:  Negative for arthralgias and neck pain.   Skin:  Negative for rash.   Allergic/Immunologic: Negative for food allergies.   Neurological:  Negative for dizziness, tremors, seizures, syncope, weakness (global weakness) and headaches.   Psychiatric/Behavioral:  Positive for decreased  concentration. Negative for behavioral problems, confusion, dysphoric mood and hallucinations. The patient is not nervous/anxious.         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 No pertinent surgical history.    Social History    reports that he has been smoking vaping with nicotine. He has been exposed to tobacco smoke. He has never used smokeless tobacco. He reports that he does not drink alcohol and does not use drugs.    Family History  's family history includes Diabetes in his maternal aunt and maternal uncle; Stroke in his mother.    Medications and Allergies     Medications  No outpatient medications have been marked as taking for the 4/8/24 encounter (Office Visit) with Josesito Breaux MD.       Allergies  Review of patient's allergies indicates:  No Known Allergies    Physical Examination     Vitals:    04/08/24 1046   BP: (!) 150/88   Pulse: 83   Resp: 16   Temp: 98 °F (36.7 °C)     Physical Exam  Constitutional:       General: He is not in acute distress.     Appearance: Normal appearance.   HENT:      Head: Normocephalic.      Right Ear: Tympanic membrane and ear canal normal.      Left Ear: Tympanic membrane and ear canal normal.      Nose: Nose normal.      Mouth/Throat:      Mouth: Mucous membranes are moist.      Pharynx: No oropharyngeal exudate.   Eyes:      Extraocular Movements: Extraocular movements intact.      Pupils: Pupils are equal, round, and reactive to light.   Cardiovascular:      Rate and Rhythm: Normal rate and regular rhythm.      Heart sounds: No murmur heard.  Pulmonary:      Effort: Pulmonary effort is normal.      Breath sounds: Normal breath sounds. No wheezing.    Abdominal:      General: Abdomen is flat. Bowel sounds are normal.      Palpations: Abdomen is soft.      Hernia: No hernia is present.   Musculoskeletal:         General: Normal range of motion.      Cervical back: Normal range of motion and neck supple.      Right lower leg: No edema.      Left lower leg: No edema.   Lymphadenopathy:      Cervical: No cervical adenopathy.   Skin:     General: Skin is warm and dry.      Coloration: Skin is not jaundiced.      Findings: No lesion.   Neurological:      General: No focal deficit present.      Mental Status: He is alert and oriented to person, place, and time.      Cranial Nerves: No cranial nerve deficit.      Gait: Gait normal.   Psychiatric:         Mood and Affect: Mood normal.         Behavior: Behavior normal.         Judgment: Judgment normal.          Assessment and Plan (including Health Maintenance)      Problem List  Smart Sets  Document Outside HM   :    Plan:     1. Erectile dysfunction, unspecified erectile dysfunction type  The current medical regimen is effective;  continue present plan and medications.  -     sildenafiL (VIAGRA) 100 MG tablet; Take 1 tablet (100 mg total) by mouth as needed for Erectile Dysfunction.  Dispense: 30 tablet; Refill: 11    2. Attention deficit disorder (ADD) without hyperactivity  The current medical regimen is effective;  continue present plan and medications.  -     dextroamphetamine-amphetamine 30 mg Tab; Take 1 tablet (30 mg total) by mouth 2 (two) times a day.  Dispense: 60 tablet; Refill: 0    3. Panic disorder (episodic paroxysmal anxiety)  The current medical regimen is effective;  continue present plan and medications.  -     ALPRAZolam (XANAX) 0.5 MG tablet; Take 1 tablet (0.5 mg total) by mouth 3 (three) times daily.  Dispense: 90 tablet; Refill: 2          Health Maintenance Due   Topic Date Due    Hepatitis C Screening  Never done    COVID-19 Vaccine (1) Never done    Pneumococcal Vaccines (Age 0-64) (1 of 2  - PCV) Never done    HIV Screening  Never done    TETANUS VACCINE  10/26/2018    Low Dose Statin  05/11/2022    Shingles Vaccine (1 of 2) Never done    Colorectal Cancer Screening  Never done    Foot Exam  05/26/2024       1. Type 2 diabetes mellitus without complication, without long-term current use of insulin    2. Erectile dysfunction, unspecified erectile dysfunction type  -     sildenafiL (VIAGRA) 100 MG tablet; Take 1 tablet (100 mg total) by mouth as needed for Erectile Dysfunction.  Dispense: 30 tablet; Refill: 11    3. Attention deficit disorder (ADD) without hyperactivity  -     Discontinue: dextroamphetamine-amphetamine 30 mg Tab; Take 1 tablet (30 mg total) by mouth 2 (two) times a day.  Dispense: 60 tablet; Refill: 0  -     Discontinue: dextroamphetamine-amphetamine 30 mg Tab; Take 1 tablet (30 mg total) by mouth 2 (two) times a day.  Dispense: 60 tablet; Refill: 0  -     dextroamphetamine-amphetamine 30 mg Tab; Take 1 tablet (30 mg total) by mouth 2 (two) times a day.  Dispense: 60 tablet; Refill: 0    4. Panic disorder (episodic paroxysmal anxiety)  -     ALPRAZolam (XANAX) 0.5 MG tablet; Take 1 tablet (0.5 mg total) by mouth 3 (three) times daily.  Dispense: 90 tablet; Refill: 2    5. Post-traumatic stress disorder, acute    6. Primary hypertension         Health Maintenance Topics with due status: Not Due       Topic Last Completion Date    Eye Exam 03/05/2024    Diabetes Urine Screening 03/06/2024    Lipid Panel 03/06/2024    Hemoglobin A1c 03/06/2024       Future Appointments   Date Time Provider Department Center   6/5/2024 10:40 AM Josesito Breaux MD [facility]   3/10/2025  2:40 PM Josesito Breaux MD [facility]        There are no Patient Instructions on file for this visit.  Follow up in about 3 months (around 7/8/2024) for routine followup.     Signature:  Josesito Breaux MD      Date of encounter: 4/8/24

## 2024-07-15 ENCOUNTER — OFFICE VISIT (OUTPATIENT)
Dept: FAMILY MEDICINE | Facility: CLINIC | Age: 54
End: 2024-07-15
Payer: COMMERCIAL

## 2024-07-15 VITALS
OXYGEN SATURATION: 98 % | SYSTOLIC BLOOD PRESSURE: 168 MMHG | DIASTOLIC BLOOD PRESSURE: 70 MMHG | HEIGHT: 70 IN | WEIGHT: 297 LBS | BODY MASS INDEX: 42.52 KG/M2 | HEART RATE: 114 BPM | RESPIRATION RATE: 16 BRPM

## 2024-07-15 DIAGNOSIS — F41.0 PANIC DISORDER (EPISODIC PAROXYSMAL ANXIETY): ICD-10-CM

## 2024-07-15 DIAGNOSIS — F98.8 ATTENTION DEFICIT DISORDER (ADD) WITHOUT HYPERACTIVITY: Primary | ICD-10-CM

## 2024-07-15 DIAGNOSIS — Z79.899 ENCOUNTER FOR LONG-TERM (CURRENT) USE OF OTHER MEDICATIONS: ICD-10-CM

## 2024-07-15 DIAGNOSIS — F43.11 POST-TRAUMATIC STRESS DISORDER, ACUTE: ICD-10-CM

## 2024-07-15 DIAGNOSIS — E66.01 CLASS 3 OBESITY: ICD-10-CM

## 2024-07-15 PROBLEM — E66.813 CLASS 3 OBESITY: Status: ACTIVE | Noted: 2024-07-15

## 2024-07-15 PROCEDURE — 80305 DRUG TEST PRSMV DIR OPT OBS: CPT | Mod: QW,,, | Performed by: FAMILY MEDICINE

## 2024-07-15 PROCEDURE — 1159F MED LIST DOCD IN RCRD: CPT | Mod: CPTII,,, | Performed by: FAMILY MEDICINE

## 2024-07-15 PROCEDURE — 99213 OFFICE O/P EST LOW 20 MIN: CPT | Mod: ,,, | Performed by: FAMILY MEDICINE

## 2024-07-15 PROCEDURE — 3078F DIAST BP <80 MM HG: CPT | Mod: CPTII,,, | Performed by: FAMILY MEDICINE

## 2024-07-15 PROCEDURE — 1160F RVW MEDS BY RX/DR IN RCRD: CPT | Mod: CPTII,,, | Performed by: FAMILY MEDICINE

## 2024-07-15 PROCEDURE — 3044F HG A1C LEVEL LT 7.0%: CPT | Mod: CPTII,,, | Performed by: FAMILY MEDICINE

## 2024-07-15 PROCEDURE — 3061F NEG MICROALBUMINURIA REV: CPT | Mod: CPTII,,, | Performed by: FAMILY MEDICINE

## 2024-07-15 PROCEDURE — 3077F SYST BP >= 140 MM HG: CPT | Mod: CPTII,,, | Performed by: FAMILY MEDICINE

## 2024-07-15 PROCEDURE — 3066F NEPHROPATHY DOC TX: CPT | Mod: CPTII,,, | Performed by: FAMILY MEDICINE

## 2024-07-15 PROCEDURE — 3008F BODY MASS INDEX DOCD: CPT | Mod: CPTII,,, | Performed by: FAMILY MEDICINE

## 2024-07-15 RX ORDER — LATANOPROST 50 UG/ML
1 SOLUTION/ DROPS OPHTHALMIC DAILY
Qty: 2.5 ML | Refills: 3 | Status: SHIPPED | OUTPATIENT
Start: 2024-07-15

## 2024-07-15 RX ORDER — DEXTROAMPHETAMINE SACCHARATE, AMPHETAMINE ASPARTATE, DEXTROAMPHETAMINE SULFATE AND AMPHETAMINE SULFATE 7.5; 7.5; 7.5; 7.5 MG/1; MG/1; MG/1; MG/1
1 TABLET ORAL 2 TIMES DAILY
Qty: 60 TABLET | Refills: 0 | Status: SHIPPED | OUTPATIENT
Start: 2024-07-15

## 2024-07-15 RX ORDER — DEXTROAMPHETAMINE SACCHARATE, AMPHETAMINE ASPARTATE, DEXTROAMPHETAMINE SULFATE AND AMPHETAMINE SULFATE 7.5; 7.5; 7.5; 7.5 MG/1; MG/1; MG/1; MG/1
1 TABLET ORAL 2 TIMES DAILY
Qty: 60 TABLET | Refills: 0 | Status: SHIPPED | OUTPATIENT
Start: 2024-07-15 | End: 2024-07-15 | Stop reason: SDUPTHER

## 2024-07-15 RX ORDER — ALPRAZOLAM 0.5 MG/1
0.5 TABLET ORAL 3 TIMES DAILY
Qty: 90 TABLET | Refills: 2 | Status: SHIPPED | OUTPATIENT
Start: 2024-07-15

## 2024-07-15 NOTE — PROGRESS NOTES
Josesito Breaux MD        PATIENT NAME: Osmar Brito  : 1970  DATE: 7/15/24  MRN: [id number]      Billing Provider: Josesito Breaux MD  Level of Service: SC OFFICE/OUTPT VISIT, EST, LEVL III, 20-29 MIN  Patient PCP Information       Provider PCP Type    Luis Mosley MD General            Reason for Visit / Chief Complaint: ADHD (3 month med check)       Update PCP  Update Chief Complaint         History of Present Illness / Problem Focused Workflow     Osmar Briot presents to the clinic with ADHD (3 month med check)     Resigned from [facility] 7/10/ 2024 due to PTSD.        Review of Systems     Review of Systems   Constitutional:  Negative for activity change, appetite change, fever and unexpected weight change.   HENT:  Negative for congestion, rhinorrhea, sinus pressure, sinus pain, sore throat and trouble swallowing.    Eyes:  Negative for photophobia, pain, discharge and visual disturbance.   Respiratory:  Negative for cough, chest tightness, wheezing and stridor.    Cardiovascular:  Negative for chest pain, palpitations and leg swelling.   Gastrointestinal:  Negative for abdominal pain, blood in stool, constipation, diarrhea and nausea.   Endocrine: Negative for polydipsia, polyphagia and polyuria.   Genitourinary:  Negative for difficulty urinating, flank pain and hematuria.   Musculoskeletal:  Negative for arthralgias and neck pain.   Skin:  Negative for rash.   Allergic/Immunologic: Negative for food allergies.   Neurological:  Negative for dizziness, tremors, seizures, syncope, weakness (global weakness) and headaches.   Psychiatric/Behavioral:  Positive for dysphoric mood. Negative for behavioral problems, confusion, decreased concentration and hallucinations. The patient is nervous/anxious.         Medical / Social / Family History     Past Medical History:   Diagnosis Date    Hyperlipidemia     Hypertension     Narcolepsy without cataplexy 2020    Obesity 10/18/2016    Panic disorder  (episodic paroxysmal anxiety) 05/05/2020    Post-traumatic stress disorder, acute 05/05/2020    Type II diabetes mellitus 05/05/2020       History reviewed. No pertinent surgical history.    Social History    reports that he has been smoking vaping with nicotine. He has been exposed to tobacco smoke. He has never used smokeless tobacco. He reports that he does not drink alcohol and does not use drugs.    Family History  's family history includes Diabetes in his maternal aunt and maternal uncle; Stroke in his mother.    Medications and Allergies     Medications  No outpatient medications have been marked as taking for the 7/15/24 encounter (Office Visit) with Josesito Breaux MD.       Allergies  Review of patient's allergies indicates:  No Known Allergies    Physical Examination     Vitals:    07/15/24 1105   BP: (!) 168/70   Pulse: (!) 114   Resp: 16     Physical Exam  Constitutional:       General: He is not in acute distress.     Appearance: Normal appearance.   HENT:      Head: Normocephalic.      Right Ear: Tympanic membrane and ear canal normal.      Left Ear: Tympanic membrane and ear canal normal.      Nose: Nose normal.      Mouth/Throat:      Mouth: Mucous membranes are moist.      Pharynx: No oropharyngeal exudate.   Eyes:      Extraocular Movements: Extraocular movements intact.      Pupils: Pupils are equal, round, and reactive to light.   Cardiovascular:      Rate and Rhythm: Normal rate and regular rhythm.      Heart sounds: No murmur heard.  Pulmonary:      Effort: Pulmonary effort is normal.      Breath sounds: Normal breath sounds. No wheezing.   Abdominal:      General: Abdomen is flat. Bowel sounds are normal.      Palpations: Abdomen is soft.      Hernia: No hernia is present.   Musculoskeletal:         General: Normal range of motion.      Cervical back: Normal range of motion and neck supple.      Right lower leg: No edema.      Left lower leg: No edema.   Lymphadenopathy:       Cervical: No cervical adenopathy.   Skin:     General: Skin is warm and dry.      Coloration: Skin is not jaundiced.      Findings: No lesion.   Neurological:      General: No focal deficit present.      Mental Status: He is alert and oriented to person, place, and time.      Cranial Nerves: No cranial nerve deficit.      Gait: Gait normal.   Psychiatric:         Mood and Affect: Mood normal.         Behavior: Behavior normal.         Judgment: Judgment normal.          Assessment and Plan (including Health Maintenance)      Problem List  Smart Sets  Document Outside HM   :    Plan:     1. Encounter for long-term (current) use of other medications  The current medical regimen is effective;  continue present plan and medications.  -     POCT Urine Drug Screen Presump    2. Attention deficit disorder (ADD) without hyperactivity  The current medical regimen is effective;  continue present plan and medications.    3. Panic disorder (episodic paroxysmal anxiety)  The current medical regimen is effective;  continue present plan and medications.    4. Class 3 obesity            Health Maintenance Due   Topic Date Due    Hepatitis C Screening  Never done    Pneumococcal Vaccines (Age 0-64) (1 of 2 - PCV) Never done    HIV Screening  Never done    TETANUS VACCINE  10/26/2018    Low Dose Statin  05/11/2022    Shingles Vaccine (1 of 2) Never done    Colorectal Cancer Screening  Never done    COVID-19 Vaccine (1 - 2023-24 season) Never done    Foot Exam  05/26/2024       1. Attention deficit disorder (ADD) without hyperactivity  -     Discontinue: dextroamphetamine-amphetamine 30 mg Tab; Take 1 tablet (30 mg total) by mouth 2 (two) times a day.  Dispense: 60 tablet; Refill: 0  -     Discontinue: dextroamphetamine-amphetamine 30 mg Tab; Take 1 tablet (30 mg total) by mouth 2 (two) times a day.  Dispense: 60 tablet; Refill: 0  -     dextroamphetamine-amphetamine 30 mg Tab; Take 1 tablet (30 mg total) by mouth 2 (two) times a day.   Dispense: 60 tablet; Refill: 0    2. Encounter for long-term (current) use of other medications  -     POCT Urine Drug Screen Presump    3. Panic disorder (episodic paroxysmal anxiety)  -     ALPRAZolam (XANAX) 0.5 MG tablet; Take 1 tablet (0.5 mg total) by mouth 3 (three) times daily.  Dispense: 90 tablet; Refill: 2    4. Class 3 obesity    5. Post-traumatic stress disorder, acute    Other orders  -     latanoprost 0.005 % ophthalmic solution; Place 1 drop into both eyes once daily.  Dispense: 2.5 mL; Refill: 3         Health Maintenance Topics with due status: Not Due       Topic Last Completion Date    Eye Exam 03/05/2024    Diabetes Urine Screening 03/06/2024    Lipid Panel 03/06/2024    Hemoglobin A1c 03/06/2024       Future Appointments   Date Time Provider Department Center   3/10/2025  2:40 PM Josesito Breaux MD [facility]        There are no Patient Instructions on file for this visit.  Follow up in about 3 months (around 10/15/2024) for routine followup.     Signature:  Josesito Breaux MD      Date of encounter: 7/15/24

## 2024-10-01 RX ORDER — ATORVASTATIN CALCIUM 20 MG/1
20 TABLET, FILM COATED ORAL
COMMUNITY
Start: 2024-08-02

## 2024-10-07 DIAGNOSIS — E11.9 TYPE 2 DIABETES MELLITUS WITHOUT COMPLICATION, WITHOUT LONG-TERM CURRENT USE OF INSULIN: ICD-10-CM

## 2024-10-07 DIAGNOSIS — I10 PRIMARY HYPERTENSION: Primary | ICD-10-CM

## 2024-10-07 DIAGNOSIS — E78.5 HYPERLIPIDEMIA, UNSPECIFIED HYPERLIPIDEMIA TYPE: ICD-10-CM

## 2024-10-15 ENCOUNTER — OFFICE VISIT (OUTPATIENT)
Dept: FAMILY MEDICINE | Facility: CLINIC | Age: 54
End: 2024-10-15
Payer: COMMERCIAL

## 2024-10-15 VITALS
SYSTOLIC BLOOD PRESSURE: 156 MMHG | HEIGHT: 70 IN | TEMPERATURE: 98 F | HEART RATE: 82 BPM | DIASTOLIC BLOOD PRESSURE: 85 MMHG | RESPIRATION RATE: 20 BRPM | OXYGEN SATURATION: 94 % | WEIGHT: 297 LBS | BODY MASS INDEX: 42.52 KG/M2

## 2024-10-15 DIAGNOSIS — K04.7 DENTAL ABSCESS: ICD-10-CM

## 2024-10-15 DIAGNOSIS — F41.0 PANIC DISORDER (EPISODIC PAROXYSMAL ANXIETY): ICD-10-CM

## 2024-10-15 DIAGNOSIS — Z79.899 ENCOUNTER FOR LONG-TERM (CURRENT) DRUG USE: ICD-10-CM

## 2024-10-15 DIAGNOSIS — F43.11 POST-TRAUMATIC STRESS DISORDER, ACUTE: ICD-10-CM

## 2024-10-15 DIAGNOSIS — E11.9 TYPE 2 DIABETES MELLITUS WITHOUT COMPLICATION, WITHOUT LONG-TERM CURRENT USE OF INSULIN: Primary | ICD-10-CM

## 2024-10-15 DIAGNOSIS — F98.8 ATTENTION DEFICIT DISORDER (ADD) WITHOUT HYPERACTIVITY: ICD-10-CM

## 2024-10-15 DIAGNOSIS — I10 PRIMARY HYPERTENSION: ICD-10-CM

## 2024-10-15 DIAGNOSIS — N52.9 ERECTILE DYSFUNCTION, UNSPECIFIED ERECTILE DYSFUNCTION TYPE: ICD-10-CM

## 2024-10-15 DIAGNOSIS — Z12.5 PROSTATE CANCER SCREENING: ICD-10-CM

## 2024-10-15 LAB
CTP QC/QA: YES
POC (AMP) AMPHETAMINE: ABNORMAL
POC (BAR) BARBITURATES: NEGATIVE
POC (BUP) BUPRENORPHINE: NEGATIVE
POC (BZO) BENZODIAZEPINES: ABNORMAL
POC (COC) COCAINE: NEGATIVE
POC (MDMA) METHYLENEDIOXYMETHAMPHETAMINE 3,4: NEGATIVE
POC (MET) METHAMPHETAMINE: NEGATIVE
POC (MOP) OPIATES: NEGATIVE
POC (MTD) METHADONE: NEGATIVE
POC (OXY) OXYCODONE: NEGATIVE
POC (PCP) PHENCYCLIDINE: NEGATIVE
POC (TCA) TRICYCLIC ANTIDEPRESSANTS: ABNORMAL
POC TEMPERATURE (URINE): 90
POC THC: NEGATIVE
PSA SERPL-MCNC: 0.29 NG/ML

## 2024-10-15 PROCEDURE — 80305 DRUG TEST PRSMV DIR OPT OBS: CPT | Mod: QW,,, | Performed by: FAMILY MEDICINE

## 2024-10-15 PROCEDURE — G0103 PSA SCREENING: HCPCS | Mod: ,,, | Performed by: CLINICAL MEDICAL LABORATORY

## 2024-10-15 PROCEDURE — 99214 OFFICE O/P EST MOD 30 MIN: CPT | Mod: ,,, | Performed by: FAMILY MEDICINE

## 2024-10-15 RX ORDER — DEXTROAMPHETAMINE SACCHARATE, AMPHETAMINE ASPARTATE, DEXTROAMPHETAMINE SULFATE AND AMPHETAMINE SULFATE 7.5; 7.5; 7.5; 7.5 MG/1; MG/1; MG/1; MG/1
1 TABLET ORAL 2 TIMES DAILY
Qty: 60 TABLET | Refills: 0 | Status: SHIPPED | OUTPATIENT
Start: 2024-10-15 | End: 2024-10-15 | Stop reason: SDUPTHER

## 2024-10-15 RX ORDER — ALPRAZOLAM 0.5 MG/1
0.5 TABLET ORAL 3 TIMES DAILY
Qty: 90 TABLET | Refills: 2 | Status: SHIPPED | OUTPATIENT
Start: 2024-10-15

## 2024-10-15 RX ORDER — SILDENAFIL 100 MG/1
100 TABLET, FILM COATED ORAL
Qty: 30 TABLET | Refills: 11 | Status: SHIPPED | OUTPATIENT
Start: 2024-10-15

## 2024-10-15 RX ORDER — LATANOPROST 50 UG/ML
1 SOLUTION/ DROPS OPHTHALMIC DAILY
Qty: 2.5 ML | Refills: 3 | Status: SHIPPED | OUTPATIENT
Start: 2024-10-15

## 2024-10-15 RX ORDER — AMOXICILLIN 500 MG/1
500 CAPSULE ORAL EVERY 8 HOURS
Qty: 30 CAPSULE | Refills: 0 | Status: SHIPPED | OUTPATIENT
Start: 2024-10-15

## 2024-10-15 RX ORDER — DEXTROAMPHETAMINE SACCHARATE, AMPHETAMINE ASPARTATE, DEXTROAMPHETAMINE SULFATE AND AMPHETAMINE SULFATE 7.5; 7.5; 7.5; 7.5 MG/1; MG/1; MG/1; MG/1
1 TABLET ORAL 2 TIMES DAILY
Qty: 60 TABLET | Refills: 0 | Status: SHIPPED | OUTPATIENT
Start: 2024-10-15

## 2024-10-15 NOTE — PROGRESS NOTES
Josesito Breaux MD        PATIENT NAME: Osmar Brito  : 1970  DATE: 10/15/24  MRN: [id number]      Billing Provider: Josesito Breaux MD  Level of Service: ME OFFICE/OUTPT VISIT, EST, LEVL IV, 30-39 MIN  Patient PCP Information       Provider PCP Type    Luis Mosley MD General            Reason for Visit / Chief Complaint: ADHD (Check up for refill), Anxiety (refill), and Dental Pain (Want antibiotic for tooth pain)       Update PCP  Update Chief Complaint         History of Present Illness / Problem Focused Workflow     Osmar Brito presents to the clinic with ADHD (Check up for refill), Anxiety (refill), and Dental Pain (Want antibiotic for tooth pain)     Routine followup.  No significant interval change.  Feels better not working-less stress.      Anxiety  Symptoms include nervous/anxious behavior. Patient reports no chest pain, confusion, decreased concentration, dizziness, dysphagia, nausea or palpitations.       Dental Pain   Pertinent negatives include no fever or sinus pressure.       Review of Systems     Review of Systems   Constitutional:  Negative for activity change, appetite change, fever and unexpected weight change.   HENT:  Negative for congestion, rhinorrhea, sinus pressure, sinus pain, sore throat and trouble swallowing.    Eyes:  Negative for photophobia, pain, discharge and visual disturbance.   Respiratory:  Negative for cough, chest tightness, wheezing and stridor.    Cardiovascular:  Negative for chest pain, palpitations and leg swelling.   Gastrointestinal:  Negative for abdominal pain, blood in stool, constipation, diarrhea and nausea.   Endocrine: Negative for polydipsia, polyphagia and polyuria.   Genitourinary:  Negative for difficulty urinating, flank pain and hematuria.   Musculoskeletal:  Negative for arthralgias and neck pain.   Skin:  Negative for rash.   Allergic/Immunologic: Negative for food allergies.   Neurological:  Negative for dizziness, tremors, seizures,  syncope, weakness (global weakness) and headaches.   Psychiatric/Behavioral:  Positive for dysphoric mood. Negative for behavioral problems, confusion, decreased concentration and hallucinations. The patient is nervous/anxious.         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 No pertinent surgical history.    Social History    reports that he has been smoking vaping with nicotine. He has been exposed to tobacco smoke. He has never used smokeless tobacco. He reports that he does not drink alcohol and does not use drugs.    Family History  's family history includes Diabetes in his maternal aunt and maternal uncle; Stroke in his mother.    Medications and Allergies     Medications  No outpatient medications have been marked as taking for the 10/15/24 encounter (Office Visit) with Josesito Breaux MD.       Allergies  Review of patient's allergies indicates:  No Known Allergies    Physical Examination     Vitals:    10/15/24 0815   BP: (!) 156/85   Pulse: 82   Resp: 20   Temp: 97.7 °F (36.5 °C)     Physical Exam  Constitutional:       General: He is not in acute distress.     Appearance: Normal appearance.   HENT:      Head: Normocephalic.      Right Ear: Tympanic membrane and ear canal normal.      Left Ear: Tympanic membrane and ear canal normal.      Nose: Nose normal.      Mouth/Throat:      Mouth: Mucous membranes are moist.      Pharynx: No oropharyngeal exudate.   Eyes:      Extraocular Movements: Extraocular movements intact.      Pupils: Pupils are equal, round, and reactive to light.   Cardiovascular:      Rate and Rhythm: Normal rate and regular rhythm.      Heart sounds: No murmur  heard.  Pulmonary:      Effort: Pulmonary effort is normal.      Breath sounds: Normal breath sounds. No wheezing.   Abdominal:      General: Abdomen is flat. Bowel sounds are normal.      Palpations: Abdomen is soft.      Hernia: No hernia is present.   Musculoskeletal:         General: Normal range of motion.      Cervical back: Normal range of motion and neck supple.      Right lower leg: No edema.      Left lower leg: No edema.   Lymphadenopathy:      Cervical: No cervical adenopathy.   Skin:     General: Skin is warm and dry.      Coloration: Skin is not jaundiced.      Findings: No lesion.   Neurological:      General: No focal deficit present.      Mental Status: He is alert and oriented to person, place, and time.      Cranial Nerves: No cranial nerve deficit.      Gait: Gait normal.   Psychiatric:         Mood and Affect: Mood normal.         Behavior: Behavior normal.         Judgment: Judgment normal.          Assessment and Plan (including Health Maintenance)      Problem List  Smart Sets  Document Outside HM   :    Plan:     1. Encounter for long-term (current) drug use  The current medical regimen is effective;  continue present plan and medications.  -     POCT Urine Drug Screen Presump    2. Prostate cancer screening    -     PSA, Screening    3. Panic disorder (episodic paroxysmal anxiety)  The current medical regimen is effective;  continue present plan and medications.    4. Erectile dysfunction, unspecified erectile dysfunction type  The current medical regimen is effective;  continue present plan and medications.    5. Attention deficit disorder (ADD) without hyperactivity  The current medical regimen is effective;  continue present plan and medications.          Health Maintenance Due   Topic Date Due    Hepatitis C Screening  Never done    Pneumococcal Vaccines (Age 0-64) (1 of 2 - PCV) Never done    HIV Screening  Never done    Shingles Vaccine (1 of 2) Never done    Colorectal Cancer  Screening  Never done    Foot Exam  05/26/2024    COVID-19 Vaccine (1 - 2024-25 season) Never done    Hemoglobin A1c  09/06/2024       1. Type 2 diabetes mellitus without complication, without long-term current use of insulin    2. Encounter for long-term (current) drug use  -     POCT Urine Drug Screen Presump    3. Prostate cancer screening  -     PSA, Screening    4. Panic disorder (episodic paroxysmal anxiety)  -     ALPRAZolam (XANAX) 0.5 MG tablet; Take 1 tablet (0.5 mg total) by mouth 3 (three) times daily.  Dispense: 90 tablet; Refill: 2    5. Erectile dysfunction, unspecified erectile dysfunction type  -     sildenafiL (VIAGRA) 100 MG tablet; Take 1 tablet (100 mg total) by mouth as needed for Erectile Dysfunction.  Dispense: 30 tablet; Refill: 11    6. Attention deficit disorder (ADD) without hyperactivity  -     Discontinue: dextroamphetamine-amphetamine 30 mg Tab; Take 1 tablet (30 mg total) by mouth 2 (two) times a day.  Dispense: 60 tablet; Refill: 0  -     Discontinue: dextroamphetamine-amphetamine 30 mg Tab; Take 1 tablet (30 mg total) by mouth 2 (two) times a day.  Dispense: 60 tablet; Refill: 0  -     dextroamphetamine-amphetamine 30 mg Tab; Take 1 tablet (30 mg total) by mouth 2 (two) times a day.  Dispense: 60 tablet; Refill: 0    7. Dental abscess  -     amoxicillin (AMOXIL) 500 MG capsule; Take 1 capsule (500 mg total) by mouth every 8 (eight) hours.  Dispense: 30 capsule; Refill: 0    8. Post-traumatic stress disorder, acute    9. Primary hypertension    Other orders  -     latanoprost 0.005 % ophthalmic solution; Place 1 drop into both eyes once daily.  Dispense: 2.5 mL; Refill: 3         Health Maintenance Topics with due status: Not Due       Topic Last Completion Date    Eye Exam 03/05/2024    Diabetes Urine Screening 03/06/2024    Lipid Panel 03/06/2024    Low Dose Statin 08/02/2024    TETANUS VACCINE 09/16/2024    RSV Vaccine (Age 60+ and Pregnant patients) Not Due       Future  Appointments   Date Time Provider Department Center   3/10/2025  2:40 PM Josesito Breaux MD [facility]        There are no Patient Instructions on file for this visit.  Follow up in about 3 months (around 1/15/2025) for routine followup.     Signature:  Josesito Breaux MD      Date of encounter: 10/15/24

## 2024-11-18 RX ORDER — CIPROFLOXACIN 500 MG/1
500 TABLET ORAL
COMMUNITY
End: 2024-11-18 | Stop reason: SDUPTHER

## 2024-11-18 RX ORDER — CIPROFLOXACIN 500 MG/1
500 TABLET ORAL EVERY 12 HOURS
Qty: 30 TABLET | Refills: 0 | Status: SHIPPED | OUTPATIENT
Start: 2024-11-18

## 2025-01-24 ENCOUNTER — OFFICE VISIT (OUTPATIENT)
Dept: FAMILY MEDICINE | Facility: CLINIC | Age: 55
End: 2025-01-24

## 2025-01-24 VITALS
TEMPERATURE: 98 F | DIASTOLIC BLOOD PRESSURE: 83 MMHG | BODY MASS INDEX: 41.37 KG/M2 | HEART RATE: 115 BPM | SYSTOLIC BLOOD PRESSURE: 140 MMHG | OXYGEN SATURATION: 99 % | HEIGHT: 70 IN | WEIGHT: 289 LBS | RESPIRATION RATE: 18 BRPM

## 2025-01-24 DIAGNOSIS — Z79.899 ENCOUNTER FOR LONG-TERM (CURRENT) DRUG USE: ICD-10-CM

## 2025-01-24 DIAGNOSIS — H40.9 GLAUCOMA, UNSPECIFIED GLAUCOMA TYPE, UNSPECIFIED LATERALITY: ICD-10-CM

## 2025-01-24 DIAGNOSIS — F41.0 PANIC DISORDER (EPISODIC PAROXYSMAL ANXIETY): ICD-10-CM

## 2025-01-24 DIAGNOSIS — I10 PRIMARY HYPERTENSION: ICD-10-CM

## 2025-01-24 DIAGNOSIS — F98.8 ATTENTION DEFICIT DISORDER (ADD) WITHOUT HYPERACTIVITY: Primary | ICD-10-CM

## 2025-01-24 LAB
CTP QC/QA: YES
POC (AMP) AMPHETAMINE: NEGATIVE
POC (BAR) BARBITURATES: NEGATIVE
POC (BUP) BUPRENORPHINE: NEGATIVE
POC (BZO) BENZODIAZEPINES: NEGATIVE
POC (COC) COCAINE: NEGATIVE
POC (MDMA) METHYLENEDIOXYMETHAMPHETAMINE 3,4: NEGATIVE
POC (MET) METHAMPHETAMINE: NEGATIVE
POC (MOP) OPIATES: NEGATIVE
POC (MTD) METHADONE: NEGATIVE
POC (OXY) OXYCODONE: NEGATIVE
POC (PCP) PHENCYCLIDINE: NEGATIVE
POC (TCA) TRICYCLIC ANTIDEPRESSANTS: NORMAL
POC TEMPERATURE (URINE): 92
POC THC: NEGATIVE

## 2025-01-24 PROCEDURE — 80305 DRUG TEST PRSMV DIR OPT OBS: CPT | Mod: QW,,, | Performed by: FAMILY MEDICINE

## 2025-01-24 PROCEDURE — 99213 OFFICE O/P EST LOW 20 MIN: CPT | Mod: ,,, | Performed by: FAMILY MEDICINE

## 2025-01-24 RX ORDER — LATANOPROST 50 UG/ML
1 SOLUTION/ DROPS OPHTHALMIC DAILY
Qty: 2.5 ML | Refills: 3 | Status: SHIPPED | OUTPATIENT
Start: 2025-01-24

## 2025-01-24 RX ORDER — DEXTROAMPHETAMINE SACCHARATE, AMPHETAMINE ASPARTATE, DEXTROAMPHETAMINE SULFATE AND AMPHETAMINE SULFATE 7.5; 7.5; 7.5; 7.5 MG/1; MG/1; MG/1; MG/1
1 TABLET ORAL 2 TIMES DAILY
Qty: 60 TABLET | Refills: 0 | Status: SHIPPED | OUTPATIENT
Start: 2025-01-24 | End: 2025-01-24 | Stop reason: SDUPTHER

## 2025-01-24 RX ORDER — HYDROCHLOROTHIAZIDE 12.5 MG/1
12.5 TABLET ORAL DAILY
Qty: 90 TABLET | Refills: 3 | Status: SHIPPED | OUTPATIENT
Start: 2025-01-24

## 2025-01-24 RX ORDER — ALPRAZOLAM 0.5 MG/1
0.5 TABLET ORAL 3 TIMES DAILY
Qty: 90 TABLET | Refills: 2 | Status: SHIPPED | OUTPATIENT
Start: 2025-01-24

## 2025-01-24 RX ORDER — ALPRAZOLAM 0.5 MG/1
0.5 TABLET ORAL 3 TIMES DAILY
Qty: 90 TABLET | Refills: 2 | Status: CANCELLED | OUTPATIENT
Start: 2025-01-24

## 2025-01-24 RX ORDER — DEXTROAMPHETAMINE SACCHARATE, AMPHETAMINE ASPARTATE, DEXTROAMPHETAMINE SULFATE AND AMPHETAMINE SULFATE 7.5; 7.5; 7.5; 7.5 MG/1; MG/1; MG/1; MG/1
1 TABLET ORAL 2 TIMES DAILY
Qty: 60 TABLET | Refills: 0 | Status: SHIPPED | OUTPATIENT
Start: 2025-01-24

## 2025-01-24 NOTE — PROGRESS NOTES
Josesito Breaux MD        PATIENT NAME: Osmar Brito  : 1970  DATE: 25  MRN: [id number]      Billing Provider: Josesito Breaux MD  Level of Service: UT OFFICE/OUTPT VISIT, EST, LEVL III, 20-29 MIN  Patient PCP Information       Provider PCP Type    Luis Mosley MD General            Reason for Visit / Chief Complaint: Medication Refill and Arthritis       Update PCP  Update Chief Complaint         History of Present Illness / Problem Focused Workflow     Osmar Briot presents to the clinic with Medication Refill and Arthritis     Routine followup.  No significant interval change      Medication Refill  Pertinent negatives include no abdominal pain, arthralgias, chest pain, congestion, coughing, fever, headaches, nausea, neck pain, rash, sore throat or weakness (global weakness).   Arthritis  Pertinent negatives include no diarrhea, fever or rash.       Review of Systems     Review of Systems   Constitutional:  Negative for activity change, appetite change, fever and unexpected weight change.   HENT:  Negative for congestion, rhinorrhea, sinus pressure, sinus pain, sore throat and trouble swallowing.    Eyes:  Negative for photophobia, pain, discharge and visual disturbance.   Respiratory:  Negative for cough, chest tightness, wheezing and stridor.    Cardiovascular:  Negative for chest pain, palpitations and leg swelling.   Gastrointestinal:  Negative for abdominal pain, blood in stool, constipation, diarrhea and nausea.   Endocrine: Negative for polydipsia, polyphagia and polyuria.   Genitourinary:  Negative for difficulty urinating, flank pain and hematuria.   Musculoskeletal:  Positive for arthritis. Negative for arthralgias and neck pain.   Skin:  Negative for rash.   Allergic/Immunologic: Negative for food allergies.   Neurological:  Negative for dizziness, tremors, seizures, syncope, weakness (global weakness) and headaches.   Psychiatric/Behavioral:  Positive for dysphoric mood and sleep  disturbance. Negative for behavioral problems, confusion, decreased concentration and hallucinations. The patient is nervous/anxious.         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    Social History    reports that he has been smoking vaping with nicotine. He has been exposed to tobacco smoke. He has never used smokeless tobacco. He reports that he does not drink alcohol and does not use drugs.    Family History  's family history includes Diabetes in his maternal aunt and maternal uncle; Stroke in his mother.    Medications and Allergies     Medications  Outpatient Medications Marked as Taking for the 1/24/25 encounter (Office Visit) with Josesito Breaux MD   Medication Sig Dispense Refill    amoxicillin (AMOXIL) 500 MG capsule Take 1 capsule (500 mg total) by mouth every 8 (eight) hours. 30 capsule 0    atorvastatin (LIPITOR) 10 MG tablet Take 10 mg by mouth once daily.      atorvastatin (LIPITOR) 20 MG tablet Take 20 mg by mouth.      celecoxib (CELEBREX) 100 MG capsule Take 100 mg by mouth 2 (two) times daily.      ciprofloxacin HCl (CIPRO) 500 MG tablet Take 1 tablet (500 mg total) by mouth every 12 (twelve) hours. 30 tablet 0    losartan (COZAAR) 100 MG tablet Take 100 mg by mouth once daily.      metFORMIN (GLUCOPHAGE-XR) 500 MG ER 24hr tablet Take 500 mg by mouth once daily.       pregabalin (LYRICA) 100 MG capsule Take 1 capsule (100 mg total) by mouth 2 (two) times a day. 90 capsule 2    sildenafiL (VIAGRA) 100 MG tablet Take 1 tablet (100 mg total) by mouth as needed for Erectile Dysfunction. 30 tablet 11    traZODone (DESYREL) 100 MG tablet Take 1 tablet (100 mg total) by  mouth nightly. 90 tablet 2    [DISCONTINUED] ALPRAZolam (XANAX) 0.5 MG tablet Take 1 tablet (0.5 mg total) by mouth 3 (three) times daily. 90 tablet 2    [DISCONTINUED] dextroamphetamine-amphetamine 30 mg Tab Take 1 tablet (30 mg total) by mouth 2 (two) times a day. 60 tablet 0    [DISCONTINUED] hydroCHLOROthiazide (HYDRODIURIL) 12.5 MG Tab Take 12.5 mg by mouth once daily.      [DISCONTINUED] latanoprost 0.005 % ophthalmic solution Place 1 drop into both eyes once daily. 2.5 mL 3       Allergies  Review of patient's allergies indicates:  No Known Allergies    Physical Examination     Vitals:    01/24/25 0911   BP: (!) 140/83   Pulse: (!) 115   Resp: 18   Temp: 98.3 °F (36.8 °C)     Physical Exam  Constitutional:       General: He is not in acute distress.     Appearance: Normal appearance.   HENT:      Head: Normocephalic.      Right Ear: Tympanic membrane and ear canal normal.      Left Ear: Tympanic membrane and ear canal normal.      Nose: Nose normal.      Mouth/Throat:      Mouth: Mucous membranes are moist.      Pharynx: No oropharyngeal exudate.   Eyes:      Extraocular Movements: Extraocular movements intact.      Pupils: Pupils are equal, round, and reactive to light.   Cardiovascular:      Rate and Rhythm: Normal rate and regular rhythm.      Heart sounds: No murmur heard.  Pulmonary:      Effort: Pulmonary effort is normal.      Breath sounds: Normal breath sounds. No wheezing.   Abdominal:      General: Abdomen is flat. Bowel sounds are normal.      Palpations: Abdomen is soft.      Hernia: No hernia is present.   Musculoskeletal:         General: Normal range of motion.      Cervical back: Normal range of motion and neck supple.      Right lower leg: No edema.      Left lower leg: No edema.   Lymphadenopathy:      Cervical: No cervical adenopathy.   Skin:     General: Skin is warm and dry.      Coloration: Skin is not jaundiced.      Findings: No lesion.   Neurological:      General: No focal deficit  present.      Mental Status: He is alert and oriented to person, place, and time.      Cranial Nerves: No cranial nerve deficit.      Gait: Gait normal.   Psychiatric:         Mood and Affect: Mood normal.         Behavior: Behavior normal.         Judgment: Judgment normal.          Assessment and Plan (including Health Maintenance)      Problem List  Smart Sets  Document Outside HM   :    Plan:     1. Encounter for long-term (current) drug use  The current medical regimen is effective;  continue present plan and medications.  -     POCT Urine Drug Screen Presump    2. Attention deficit disorder (ADD) without hyperactivity  The current medical regimen is effective;  continue present plan and medications.  -     dextroamphetamine-amphetamine 30 mg Tab; Take 1 tablet (30 mg total) by mouth 2 (two) times a day.  Dispense: 60 tablet; Refill: 0    3. Panic disorder (episodic paroxysmal anxiety)  The current medical regimen is effective;  continue present plan and medications.  -     ALPRAZolam (XANAX) 0.5 MG tablet; Take 1 tablet (0.5 mg total) by mouth 3 (three) times daily.  Dispense: 90 tablet; Refill: 2    4. Primary hypertension  The current medical regimen is effective;  continue present plan and medications.  -     hydroCHLOROthiazide 12.5 MG Tab; Take 1 tablet (12.5 mg total) by mouth once daily.  Dispense: 90 tablet; Refill: 3    5. Glaucoma, unspecified glaucoma type, unspecified laterality  The current medical regimen is effective;  continue present plan and medications.  -     latanoprost 0.005 % ophthalmic solution; Place 1 drop into both eyes once daily.  Dispense: 2.5 mL; Refill: 3          Health Maintenance Due   Topic Date Due    Hepatitis C Screening  Never done    Pneumococcal Vaccines (Age 50+) (1 of 2 - PCV) Never done    Shingles Vaccine (1 of 2) Never done    COVID-19 Vaccine (1 - 2024-25 season) Never done    Hemoglobin A1c  09/06/2024    Diabetic Eye Exam  03/05/2025       1. Attention deficit  disorder (ADD) without hyperactivity  -     Discontinue: dextroamphetamine-amphetamine 30 mg Tab; Take 1 tablet (30 mg total) by mouth 2 (two) times a day.  Dispense: 60 tablet; Refill: 0  -     Discontinue: dextroamphetamine-amphetamine 30 mg Tab; Take 1 tablet (30 mg total) by mouth 2 (two) times a day.  Dispense: 60 tablet; Refill: 0  -     dextroamphetamine-amphetamine 30 mg Tab; Take 1 tablet (30 mg total) by mouth 2 (two) times a day.  Dispense: 60 tablet; Refill: 0    2. Panic disorder (episodic paroxysmal anxiety)  -     ALPRAZolam (XANAX) 0.5 MG tablet; Take 1 tablet (0.5 mg total) by mouth 3 (three) times daily.  Dispense: 90 tablet; Refill: 2    3. Encounter for long-term (current) drug use  -     POCT Urine Drug Screen Presump    4. Primary hypertension  -     hydroCHLOROthiazide 12.5 MG Tab; Take 1 tablet (12.5 mg total) by mouth once daily.  Dispense: 90 tablet; Refill: 3    5. Glaucoma, unspecified glaucoma type, unspecified laterality  -     latanoprost 0.005 % ophthalmic solution; Place 1 drop into both eyes once daily.  Dispense: 2.5 mL; Refill: 3         Health Maintenance Topics with due status: Not Due       Topic Last Completion Date    Diabetes Urine Screening 03/06/2024    Lipid Panel 03/06/2024    TETANUS VACCINE 09/16/2024    Colorectal Cancer Screening 12/12/2024    Low Dose Statin 01/24/2025    Foot Exam 01/24/2025    RSV Vaccine (Age 60+ and Pregnant patients) Not Due       Future Appointments   Date Time Provider Department Center   4/9/2025 11:00 AM Josesito Breaux MD [facility]        There are no Patient Instructions on file for this visit.  Follow up in about 3 months (around 4/24/2025) for routine followup.     Signature:  Josesito Breaux MD      Date of encounter: 1/24/25

## 2025-04-09 ENCOUNTER — RESULTS FOLLOW-UP (OUTPATIENT)
Dept: FAMILY MEDICINE | Facility: CLINIC | Age: 55
End: 2025-04-09

## 2025-04-09 ENCOUNTER — OFFICE VISIT (OUTPATIENT)
Dept: FAMILY MEDICINE | Facility: CLINIC | Age: 55
End: 2025-04-09

## 2025-04-09 VITALS
HEART RATE: 95 BPM | TEMPERATURE: 98 F | RESPIRATION RATE: 16 BRPM | BODY MASS INDEX: 42.23 KG/M2 | OXYGEN SATURATION: 99 % | WEIGHT: 295 LBS | HEIGHT: 70 IN | DIASTOLIC BLOOD PRESSURE: 83 MMHG | SYSTOLIC BLOOD PRESSURE: 141 MMHG

## 2025-04-09 DIAGNOSIS — H40.9 GLAUCOMA, UNSPECIFIED GLAUCOMA TYPE, UNSPECIFIED LATERALITY: ICD-10-CM

## 2025-04-09 DIAGNOSIS — F43.11 POST-TRAUMATIC STRESS DISORDER, ACUTE: Primary | ICD-10-CM

## 2025-04-09 DIAGNOSIS — N52.9 ERECTILE DYSFUNCTION, UNSPECIFIED ERECTILE DYSFUNCTION TYPE: ICD-10-CM

## 2025-04-09 DIAGNOSIS — Z79.899 ENCOUNTER FOR LONG-TERM (CURRENT) DRUG USE: ICD-10-CM

## 2025-04-09 DIAGNOSIS — F98.8 ATTENTION DEFICIT DISORDER (ADD) WITHOUT HYPERACTIVITY: ICD-10-CM

## 2025-04-09 DIAGNOSIS — F41.0 PANIC DISORDER (EPISODIC PAROXYSMAL ANXIETY): ICD-10-CM

## 2025-04-09 DIAGNOSIS — E11.9 TYPE 2 DIABETES MELLITUS WITHOUT COMPLICATION, WITHOUT LONG-TERM CURRENT USE OF INSULIN: ICD-10-CM

## 2025-04-09 LAB
CREAT UR-MCNC: 71 MG/DL (ref 23–375)
CTP QC/QA: YES
MICROALBUMIN UR-MCNC: 0.7 MG/DL
MICROALBUMIN/CREAT RATIO PNL UR: 9.9 MG/G (ref 0–30)
POC (AMP) AMPHETAMINE: ABNORMAL
POC (BAR) BARBITURATES: NEGATIVE
POC (BUP) BUPRENORPHINE: NEGATIVE
POC (BZO) BENZODIAZEPINES: NEGATIVE
POC (COC) COCAINE: NEGATIVE
POC (MDMA) METHYLENEDIOXYMETHAMPHETAMINE 3,4: NEGATIVE
POC (MET) METHAMPHETAMINE: NEGATIVE
POC (MOP) OPIATES: NEGATIVE
POC (MTD) METHADONE: NEGATIVE
POC (OXY) OXYCODONE: NEGATIVE
POC (PCP) PHENCYCLIDINE: NEGATIVE
POC (TCA) TRICYCLIC ANTIDEPRESSANTS: ABNORMAL
POC TEMPERATURE (URINE): 92
POC THC: NEGATIVE

## 2025-04-09 PROCEDURE — 82043 UR ALBUMIN QUANTITATIVE: CPT | Mod: ,,, | Performed by: CLINICAL MEDICAL LABORATORY

## 2025-04-09 PROCEDURE — 80305 DRUG TEST PRSMV DIR OPT OBS: CPT | Mod: QW,,, | Performed by: FAMILY MEDICINE

## 2025-04-09 PROCEDURE — 82570 ASSAY OF URINE CREATININE: CPT | Mod: ,,, | Performed by: CLINICAL MEDICAL LABORATORY

## 2025-04-09 PROCEDURE — 99213 OFFICE O/P EST LOW 20 MIN: CPT | Mod: ,,, | Performed by: FAMILY MEDICINE

## 2025-04-09 RX ORDER — DEXTROAMPHETAMINE SACCHARATE, AMPHETAMINE ASPARTATE, DEXTROAMPHETAMINE SULFATE AND AMPHETAMINE SULFATE 7.5; 7.5; 7.5; 7.5 MG/1; MG/1; MG/1; MG/1
1 TABLET ORAL 2 TIMES DAILY
Qty: 60 TABLET | Refills: 0 | Status: SHIPPED | OUTPATIENT
Start: 2025-04-09 | End: 2025-04-09 | Stop reason: SDUPTHER

## 2025-04-09 RX ORDER — LATANOPROST 50 UG/ML
1 SOLUTION/ DROPS OPHTHALMIC DAILY
Qty: 2.5 ML | Refills: 3 | Status: SHIPPED | OUTPATIENT
Start: 2025-04-09

## 2025-04-09 RX ORDER — ALPRAZOLAM 0.5 MG/1
0.5 TABLET ORAL 3 TIMES DAILY
Qty: 90 TABLET | Refills: 2 | Status: SHIPPED | OUTPATIENT
Start: 2025-04-09

## 2025-04-09 RX ORDER — DEXTROAMPHETAMINE SACCHARATE, AMPHETAMINE ASPARTATE, DEXTROAMPHETAMINE SULFATE AND AMPHETAMINE SULFATE 7.5; 7.5; 7.5; 7.5 MG/1; MG/1; MG/1; MG/1
1 TABLET ORAL 2 TIMES DAILY
Qty: 60 TABLET | Refills: 0 | Status: SHIPPED | OUTPATIENT
Start: 2025-04-09

## 2025-04-09 RX ORDER — SILDENAFIL 100 MG/1
100 TABLET, FILM COATED ORAL
Qty: 30 TABLET | Refills: 11 | Status: SHIPPED | OUTPATIENT
Start: 2025-04-09
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
## 2025-06-26 ENCOUNTER — PATIENT OUTREACH (OUTPATIENT)
Facility: HOSPITAL | Age: 55
End: 2025-06-26

## 2025-06-26 NOTE — PROGRESS NOTES
Population Health Chart Review & Patient Outreach Details        Health Maintenance Topics Addressed and Outreach Outcomes / Actions Taken:  labs [x] Note added to next appt for labs needed.

## 2025-06-27 ENCOUNTER — OFFICE VISIT (OUTPATIENT)
Dept: FAMILY MEDICINE | Facility: CLINIC | Age: 55
End: 2025-06-27

## 2025-06-27 VITALS
DIASTOLIC BLOOD PRESSURE: 73 MMHG | OXYGEN SATURATION: 95 % | SYSTOLIC BLOOD PRESSURE: 119 MMHG | TEMPERATURE: 98 F | RESPIRATION RATE: 20 BRPM | BODY MASS INDEX: 42.23 KG/M2 | HEIGHT: 70 IN | HEART RATE: 91 BPM | WEIGHT: 295 LBS

## 2025-06-27 DIAGNOSIS — N52.9 ERECTILE DYSFUNCTION, UNSPECIFIED ERECTILE DYSFUNCTION TYPE: ICD-10-CM

## 2025-06-27 DIAGNOSIS — Z79.899 ENCOUNTER FOR LONG-TERM (CURRENT) DRUG USE: ICD-10-CM

## 2025-06-27 DIAGNOSIS — I10 PRIMARY HYPERTENSION: ICD-10-CM

## 2025-06-27 DIAGNOSIS — E11.9 TYPE 2 DIABETES MELLITUS WITHOUT COMPLICATION, WITHOUT LONG-TERM CURRENT USE OF INSULIN: ICD-10-CM

## 2025-06-27 DIAGNOSIS — F41.0 PANIC DISORDER (EPISODIC PAROXYSMAL ANXIETY): ICD-10-CM

## 2025-06-27 DIAGNOSIS — F43.11 POST-TRAUMATIC STRESS DISORDER, ACUTE: Primary | ICD-10-CM

## 2025-06-27 DIAGNOSIS — H40.9 GLAUCOMA, UNSPECIFIED GLAUCOMA TYPE, UNSPECIFIED LATERALITY: ICD-10-CM

## 2025-06-27 DIAGNOSIS — F98.8 ATTENTION DEFICIT DISORDER (ADD) WITHOUT HYPERACTIVITY: ICD-10-CM

## 2025-06-27 LAB
CTP QC/QA: YES
POC (AMP) AMPHETAMINE: NEGATIVE
POC (BAR) BARBITURATES: NEGATIVE
POC (BUP) BUPRENORPHINE: NEGATIVE
POC (BZO) BENZODIAZEPINES: ABNORMAL
POC (COC) COCAINE: NEGATIVE
POC (MDMA) METHYLENEDIOXYMETHAMPHETAMINE 3,4: NEGATIVE
POC (MET) METHAMPHETAMINE: NEGATIVE
POC (MOP) OPIATES: NEGATIVE
POC (MTD) METHADONE: NEGATIVE
POC (OXY) OXYCODONE: NEGATIVE
POC (PCP) PHENCYCLIDINE: NEGATIVE
POC (TCA) TRICYCLIC ANTIDEPRESSANTS: ABNORMAL
POC TEMPERATURE (URINE): 90
POC THC: NEGATIVE

## 2025-06-27 RX ORDER — DEXTROAMPHETAMINE SACCHARATE, AMPHETAMINE ASPARTATE, DEXTROAMPHETAMINE SULFATE AND AMPHETAMINE SULFATE 7.5; 7.5; 7.5; 7.5 MG/1; MG/1; MG/1; MG/1
1 TABLET ORAL 2 TIMES DAILY
Qty: 60 TABLET | Refills: 0 | Status: SHIPPED | OUTPATIENT
Start: 2025-06-27 | End: 2025-06-27 | Stop reason: SDUPTHER

## 2025-06-27 RX ORDER — SILDENAFIL 100 MG/1
100 TABLET, FILM COATED ORAL
Qty: 30 TABLET | Refills: 11 | Status: SHIPPED | OUTPATIENT
Start: 2025-06-27

## 2025-06-27 RX ORDER — LATANOPROST 50 UG/ML
1 SOLUTION/ DROPS OPHTHALMIC DAILY
Qty: 2.5 ML | Refills: 3 | Status: SHIPPED | OUTPATIENT
Start: 2025-06-27

## 2025-06-27 RX ORDER — ALPRAZOLAM 0.5 MG/1
0.5 TABLET ORAL 3 TIMES DAILY
Qty: 90 TABLET | Refills: 2 | Status: SHIPPED | OUTPATIENT
Start: 2025-06-27

## 2025-06-27 RX ORDER — DEXTROAMPHETAMINE SACCHARATE, AMPHETAMINE ASPARTATE, DEXTROAMPHETAMINE SULFATE AND AMPHETAMINE SULFATE 7.5; 7.5; 7.5; 7.5 MG/1; MG/1; MG/1; MG/1
1 TABLET ORAL 2 TIMES DAILY
Qty: 60 TABLET | Refills: 0 | Status: SHIPPED | OUTPATIENT
Start: 2025-06-27

## 2025-06-27 NOTE — PROGRESS NOTES
Josesito Breaux MD        PATIENT NAME: Osmar Brito  : 1970  DATE: 25  MRN: [id number]      Billing Provider: Josesito Breaux MD  Level of Service: HI OFFICE/OUTPT VISIT, EST, LEVL IV, 30-39 MIN  Patient PCP Information       Provider PCP Type    Luis Mosley MD General            Reason for Visit / Chief Complaint: ADHD (Check up for refill) and Anxiety (refill)       Update PCP  Update Chief Complaint         History of Present Illness / Problem Focused Workflow     Osmar Brito presents to the clinic with ADHD (Check up for refill) and Anxiety (refill)     Routine followup.  No significant interval change      Anxiety  Symptoms include nervous/anxious behavior. Patient reports no chest pain, confusion, decreased concentration, dizziness, dysphagia, nausea or palpitations.           Review of Systems     Review of Systems   Constitutional:  Negative for activity change, appetite change, fever and unexpected weight change.   HENT:  Negative for congestion, rhinorrhea, sinus pressure, sinus pain, sore throat and trouble swallowing.    Eyes:  Negative for photophobia, pain, discharge and visual disturbance.   Respiratory:  Negative for cough, chest tightness, wheezing and stridor.    Cardiovascular:  Negative for chest pain, palpitations and leg swelling.   Gastrointestinal:  Negative for abdominal pain, blood in stool, constipation, diarrhea and nausea.   Endocrine: Negative for polydipsia, polyphagia and polyuria.   Genitourinary:  Negative for difficulty urinating, flank pain and hematuria.   Musculoskeletal:  Negative for arthralgias and neck pain.   Skin:  Negative for rash.   Allergic/Immunologic: Negative for food allergies.   Neurological:  Negative for dizziness, tremors, seizures, syncope, weakness (global weakness) and headaches.   Psychiatric/Behavioral:  Positive for dysphoric mood. Negative for behavioral problems, confusion, decreased concentration and hallucinations. The patient is  nervous/anxious.         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 No pertinent surgical history.    Social History    reports that he has quit smoking. His smoking use included vaping with nicotine. He has been exposed to tobacco smoke. He has never used smokeless tobacco. He reports that he does not currently use alcohol. He reports that he does not currently use drugs after having used the following drugs: Amphetamines.    Family History  's family history includes Diabetes in his maternal aunt and maternal uncle; Stroke in his mother.    Medications and Allergies     Medications  No outpatient medications have been marked as taking for the 6/27/25 encounter (Office Visit) with Josesito Breaux MD.       Allergies  Review of patient's allergies indicates:  No Known Allergies    Physical Examination     Vitals:    06/27/25 0832   BP: 119/73   Pulse: 91   Resp: 20   Temp: 97.5 °F (36.4 °C)     Physical Exam  Constitutional:       General: He is not in acute distress.     Appearance: Normal appearance.   HENT:      Head: Normocephalic.      Right Ear: Tympanic membrane and ear canal normal.      Left Ear: Tympanic membrane and ear canal normal.      Nose: Nose normal.      Mouth/Throat:      Mouth: Mucous membranes are moist.      Pharynx: No oropharyngeal exudate.   Eyes:      Extraocular Movements: Extraocular movements intact.      Pupils: Pupils are equal, round, and reactive to light.   Cardiovascular:      Rate and Rhythm: Normal rate and regular rhythm.      Heart sounds: No murmur heard.  Pulmonary:      Effort: Pulmonary effort is normal.      Breath sounds: Normal breath sounds. No wheezing.    Abdominal:      General: Abdomen is flat. Bowel sounds are normal.      Palpations: Abdomen is soft.      Hernia: No hernia is present.   Musculoskeletal:         General: Normal range of motion.      Cervical back: Normal range of motion and neck supple.      Right lower leg: No edema.      Left lower leg: No edema.   Lymphadenopathy:      Cervical: No cervical adenopathy.   Skin:     General: Skin is warm and dry.      Coloration: Skin is not jaundiced.      Findings: No lesion.   Neurological:      General: No focal deficit present.      Mental Status: He is alert and oriented to person, place, and time.      Cranial Nerves: No cranial nerve deficit.      Gait: Gait normal.   Psychiatric:         Mood and Affect: Mood normal.         Behavior: Behavior normal.         Judgment: Judgment normal.          Assessment and Plan (including Health Maintenance)      Problem List  [facility] [place]  Document Outside HM   :    Plan:     1. Encounter for long-term (current) drug use  The current medical regimen is effective;  continue present plan and medications.  -     POCT Urine Drug Screen Presump    2. Type 2 diabetes mellitus without complication, without long-term current use of insulin  The current medical regimen is effective;  continue present plan and medications.  -     Hemoglobin A1C    3. Primary hypertension  The current medical regimen is effective;  continue present plan and medications.  -     CBC Auto Differential  -     Comprehensive Metabolic Panel  -     Lipid Panel    4. Panic disorder (episodic paroxysmal anxiety)  The current medical regimen is effective;  continue present plan and medications.  -     ALPRAZolam (XANAX) 0.5 MG tablet; Take 1 tablet (0.5 mg total) by mouth 3 (three) times daily.  Dispense: 90 tablet; Refill: 2    5. Attention deficit disorder (ADD) without hyperactivity  The current medical regimen is effective;  continue present plan and medications.  -     dextroamphetamine-amphetamine  30 mg Tab; Take 1 tablet (30 mg total) by mouth 2 (two) times a day.  Dispense: 60 tablet; Refill: 0    6. Glaucoma, unspecified glaucoma type, unspecified laterality  The current medical regimen is effective;  continue present plan and medications.  -     latanoprost 0.005 % ophthalmic solution; Place 1 drop into both eyes once daily.  Dispense: 2.5 mL; Refill: 3    7. Erectile dysfunction, unspecified erectile dysfunction type  The current medical regimen is effective;  continue present plan and medications.  -     sildenafiL (VIAGRA) 100 MG tablet; Take 1 tablet (100 mg total) by mouth as needed for Erectile Dysfunction.  Dispense: 30 tablet; Refill: 11          Health Maintenance Due   Topic Date Due    Hepatitis C Screening  Never done    HIV Screening  Never done    Pneumococcal Vaccines (Age 50+) (1 of 2 - PCV) Never done    Shingles Vaccine (1 of 2) Never done    COVID-19 Vaccine (1 - 2024-25 season) Never done    Hemoglobin A1c  09/06/2024    Diabetic Eye Exam  03/05/2025    Lipid Panel  03/06/2025       1. Post-traumatic stress disorder, acute    2. Encounter for long-term (current) drug use  -     POCT Urine Drug Screen Presump    3. Type 2 diabetes mellitus without complication, without long-term current use of insulin  -     Hemoglobin A1C    4. Primary hypertension  -     CBC Auto Differential  -     Comprehensive Metabolic Panel  -     Lipid Panel    5. Panic disorder (episodic paroxysmal anxiety)  -     ALPRAZolam (XANAX) 0.5 MG tablet; Take 1 tablet (0.5 mg total) by mouth 3 (three) times daily.  Dispense: 90 tablet; Refill: 2    6. Attention deficit disorder (ADD) without hyperactivity  -     Discontinue: dextroamphetamine-amphetamine 30 mg Tab; Take 1 tablet (30 mg total) by mouth 2 (two) times a day.  Dispense: 60 tablet; Refill: 0  -     Discontinue: dextroamphetamine-amphetamine 30 mg Tab; Take 1 tablet (30 mg total) by mouth 2 (two) times a day.  Dispense: 60 tablet; Refill: 0  -      dextroamphetamine-amphetamine 30 mg Tab; Take 1 tablet (30 mg total) by mouth 2 (two) times a day.  Dispense: 60 tablet; Refill: 0    7. Glaucoma, unspecified glaucoma type, unspecified laterality  -     latanoprost 0.005 % ophthalmic solution; Place 1 drop into both eyes once daily.  Dispense: 2.5 mL; Refill: 3    8. Erectile dysfunction, unspecified erectile dysfunction type  -     sildenafiL (VIAGRA) 100 MG tablet; Take 1 tablet (100 mg total) by mouth as needed for Erectile Dysfunction.  Dispense: 30 tablet; Refill: 11         Health Maintenance Topics with due status: Not Due       Topic Last Completion Date    TETANUS VACCINE 09/16/2024    Colorectal Cancer Screening 12/12/2024    Foot Exam 01/24/2025    Low Dose Statin 04/09/2025    Diabetes Urine Screening 04/09/2025    RSV Vaccine (Age 60+ and Pregnant patients) Not Due       Future Appointments   Date Time Provider Department Center   9/5/2025  9:00 AM Gurjit Abel MD [facility]        There are no Patient Instructions on file for this visit.  Follow up in about 3 months (around 9/27/2025) for routine followup.     Signature:  Josesito Breaux MD      Date of encounter: 6/27/25

## 2025-09-05 ENCOUNTER — OFFICE VISIT (OUTPATIENT)
Dept: FAMILY MEDICINE | Facility: CLINIC | Age: 55
End: 2025-09-05

## 2025-09-05 VITALS
SYSTOLIC BLOOD PRESSURE: 132 MMHG | HEIGHT: 70 IN | HEART RATE: 93 BPM | BODY MASS INDEX: 41.66 KG/M2 | WEIGHT: 291 LBS | DIASTOLIC BLOOD PRESSURE: 81 MMHG | TEMPERATURE: 99 F | OXYGEN SATURATION: 98 % | RESPIRATION RATE: 19 BRPM

## 2025-09-05 DIAGNOSIS — M15.9 OSTEOARTHRITIS OF MULTIPLE JOINTS, UNSPECIFIED OSTEOARTHRITIS TYPE: ICD-10-CM

## 2025-09-05 DIAGNOSIS — F41.0 PANIC DISORDER (EPISODIC PAROXYSMAL ANXIETY): ICD-10-CM

## 2025-09-05 DIAGNOSIS — F98.8 ATTENTION DEFICIT DISORDER (ADD) WITHOUT HYPERACTIVITY: ICD-10-CM

## 2025-09-05 DIAGNOSIS — Z00.00 ENCOUNTER FOR MEDICAL EXAMINATION TO ESTABLISH CARE: Primary | ICD-10-CM

## 2025-09-05 DIAGNOSIS — H40.9 GLAUCOMA, UNSPECIFIED GLAUCOMA TYPE, UNSPECIFIED LATERALITY: ICD-10-CM

## 2025-09-05 DIAGNOSIS — Z00.00 HEALTH CARE MAINTENANCE: ICD-10-CM

## 2025-09-05 DIAGNOSIS — E11.9 TYPE 2 DIABETES MELLITUS WITHOUT COMPLICATION, WITHOUT LONG-TERM CURRENT USE OF INSULIN: ICD-10-CM

## 2025-09-05 DIAGNOSIS — Z79.899 MEDICATION MANAGEMENT: ICD-10-CM

## 2025-09-05 DIAGNOSIS — F43.11 POST-TRAUMATIC STRESS DISORDER, ACUTE: ICD-10-CM

## 2025-09-05 DIAGNOSIS — Z79.899 ENCOUNTER FOR LONG-TERM (CURRENT) DRUG USE: ICD-10-CM

## 2025-09-05 LAB
CTP QC/QA: YES
EST. AVERAGE GLUCOSE BLD GHB EST-MCNC: 160 MG/DL
HBA1C MFR BLD HPLC: 7.2 %
POC (AMP) AMPHETAMINE: ABNORMAL
POC (BAR) BARBITURATES: NEGATIVE
POC (BUP) BUPRENORPHINE: NEGATIVE
POC (BZO) BENZODIAZEPINES: ABNORMAL
POC (COC) COCAINE: NEGATIVE
POC (MDMA) METHYLENEDIOXYMETHAMPHETAMINE 3,4: NEGATIVE
POC (MET) METHAMPHETAMINE: NEGATIVE
POC (MOP) OPIATES: NEGATIVE
POC (MTD) METHADONE: NEGATIVE
POC (OXY) OXYCODONE: NEGATIVE
POC (PCP) PHENCYCLIDINE: NEGATIVE
POC (TCA) TRICYCLIC ANTIDEPRESSANTS: ABNORMAL
POC TEMPERATURE (URINE): 92
POC THC: NEGATIVE

## 2025-09-05 RX ORDER — ALPRAZOLAM 0.5 MG/1
0.5 TABLET ORAL 3 TIMES DAILY
Qty: 90 TABLET | Refills: 2 | Status: SHIPPED | OUTPATIENT
Start: 2025-09-05

## 2025-09-05 RX ORDER — LATANOPROST 50 UG/ML
1 SOLUTION/ DROPS OPHTHALMIC DAILY
Qty: 2.5 ML | Refills: 3 | Status: SHIPPED | OUTPATIENT
Start: 2025-09-05

## 2025-09-05 RX ORDER — DEXTROAMPHETAMINE SACCHARATE, AMPHETAMINE ASPARTATE, DEXTROAMPHETAMINE SULFATE AND AMPHETAMINE SULFATE 7.5; 7.5; 7.5; 7.5 MG/1; MG/1; MG/1; MG/1
30 TABLET ORAL 2 TIMES DAILY
Qty: 60 TABLET | Refills: 0 | Status: SHIPPED | OUTPATIENT
Start: 2025-09-05

## 2025-09-05 RX ORDER — DEXTROAMPHETAMINE SACCHARATE, AMPHETAMINE ASPARTATE, DEXTROAMPHETAMINE SULFATE AND AMPHETAMINE SULFATE 7.5; 7.5; 7.5; 7.5 MG/1; MG/1; MG/1; MG/1
1 TABLET ORAL 2 TIMES DAILY
Qty: 60 TABLET | Refills: 0 | Status: SHIPPED | OUTPATIENT
Start: 2025-09-05